# Patient Record
Sex: MALE | Race: WHITE | NOT HISPANIC OR LATINO
[De-identification: names, ages, dates, MRNs, and addresses within clinical notes are randomized per-mention and may not be internally consistent; named-entity substitution may affect disease eponyms.]

---

## 2018-04-24 ENCOUNTER — APPOINTMENT (OUTPATIENT)
Dept: FAMILY MEDICINE | Facility: CLINIC | Age: 58
End: 2018-04-24
Payer: OTHER GOVERNMENT

## 2018-04-24 VITALS
HEART RATE: 75 BPM | TEMPERATURE: 98.1 F | BODY MASS INDEX: 27.49 KG/M2 | WEIGHT: 192 LBS | OXYGEN SATURATION: 97 % | SYSTOLIC BLOOD PRESSURE: 126 MMHG | RESPIRATION RATE: 17 BRPM | HEIGHT: 70 IN | DIASTOLIC BLOOD PRESSURE: 80 MMHG

## 2018-04-24 DIAGNOSIS — R10.13 EPIGASTRIC PAIN: ICD-10-CM

## 2018-04-24 PROCEDURE — 99202 OFFICE O/P NEW SF 15 MIN: CPT

## 2018-05-08 ENCOUNTER — MESSAGE (OUTPATIENT)
Age: 58
End: 2018-05-08

## 2018-05-24 ENCOUNTER — APPOINTMENT (OUTPATIENT)
Dept: ULTRASOUND IMAGING | Facility: CLINIC | Age: 58
End: 2018-05-24
Payer: OTHER GOVERNMENT

## 2018-05-24 ENCOUNTER — OUTPATIENT (OUTPATIENT)
Dept: OUTPATIENT SERVICES | Facility: HOSPITAL | Age: 58
LOS: 1 days | End: 2018-05-24
Payer: OTHER GOVERNMENT

## 2018-05-24 DIAGNOSIS — Z00.8 ENCOUNTER FOR OTHER GENERAL EXAMINATION: ICD-10-CM

## 2018-05-24 PROCEDURE — 76705 ECHO EXAM OF ABDOMEN: CPT

## 2018-05-24 PROCEDURE — 76705 ECHO EXAM OF ABDOMEN: CPT | Mod: 26

## 2018-05-26 ENCOUNTER — LABORATORY RESULT (OUTPATIENT)
Age: 58
End: 2018-05-26

## 2018-06-04 ENCOUNTER — TRANSCRIPTION ENCOUNTER (OUTPATIENT)
Age: 58
End: 2018-06-04

## 2018-06-09 LAB
ALBUMIN SERPL ELPH-MCNC: 4.6 G/DL
ALP BLD-CCNC: 75 U/L
ALT SERPL-CCNC: 14 U/L
AST SERPL-CCNC: 15 U/L
BILIRUB DIRECT SERPL-MCNC: 0.1 MG/DL
BILIRUB INDIRECT SERPL-MCNC: 0.5 MG/DL
BILIRUB SERPL-MCNC: 0.6 MG/DL
PROT SERPL-MCNC: 6.8 G/DL

## 2018-08-31 ENCOUNTER — APPOINTMENT (OUTPATIENT)
Dept: GASTROENTEROLOGY | Facility: CLINIC | Age: 58
End: 2018-08-31
Payer: OTHER GOVERNMENT

## 2018-08-31 VITALS
HEIGHT: 70 IN | BODY MASS INDEX: 27.06 KG/M2 | RESPIRATION RATE: 17 BRPM | HEART RATE: 65 BPM | WEIGHT: 189 LBS | SYSTOLIC BLOOD PRESSURE: 120 MMHG | TEMPERATURE: 97.7 F | DIASTOLIC BLOOD PRESSURE: 80 MMHG | OXYGEN SATURATION: 98 %

## 2018-08-31 DIAGNOSIS — Z87.19 PERSONAL HISTORY OF OTHER DISEASES OF THE DIGESTIVE SYSTEM: ICD-10-CM

## 2018-08-31 DIAGNOSIS — K80.50 CALCULUS OF BILE DUCT W/OUT CHOLANGITIS OR CHOLECYSTITIS W/OUT OBSTRUCTION: ICD-10-CM

## 2018-08-31 DIAGNOSIS — Z12.11 ENCOUNTER FOR SCREENING FOR MALIGNANT NEOPLASM OF COLON: ICD-10-CM

## 2018-08-31 PROCEDURE — 99244 OFF/OP CNSLTJ NEW/EST MOD 40: CPT

## 2018-09-01 PROBLEM — Z87.19 HISTORY OF INGUINAL HERNIA: Status: RESOLVED | Noted: 2018-09-01 | Resolved: 2018-09-01

## 2018-09-01 PROBLEM — K80.50 BILIARY COLIC: Status: ACTIVE | Noted: 2018-09-01

## 2019-01-08 ENCOUNTER — RX RENEWAL (OUTPATIENT)
Age: 59
End: 2019-01-08

## 2019-01-10 ENCOUNTER — APPOINTMENT (OUTPATIENT)
Dept: FAMILY MEDICINE | Facility: CLINIC | Age: 59
End: 2019-01-10
Payer: OTHER GOVERNMENT

## 2019-01-10 VITALS — DIASTOLIC BLOOD PRESSURE: 80 MMHG | SYSTOLIC BLOOD PRESSURE: 128 MMHG

## 2019-01-10 DIAGNOSIS — R10.11 RIGHT UPPER QUADRANT PAIN: ICD-10-CM

## 2019-01-10 DIAGNOSIS — K80.20 CALCULUS OF GALLBLADDER W/OUT CHOLECYSTITIS W/OUT OBSTRUCTION: ICD-10-CM

## 2019-01-10 PROCEDURE — 99213 OFFICE O/P EST LOW 20 MIN: CPT

## 2019-01-10 RX ORDER — RANITIDINE HYDROCHLORIDE 150 MG/1
150 TABLET, FILM COATED ORAL
Refills: 0 | Status: COMPLETED | COMMUNITY
End: 2019-01-10

## 2019-01-10 RX ORDER — PANTOPRAZOLE 40 MG/1
40 TABLET, DELAYED RELEASE ORAL
Qty: 30 | Refills: 5 | Status: COMPLETED | COMMUNITY
Start: 2018-04-24 | End: 2019-01-10

## 2019-01-10 NOTE — HISTORY OF PRESENT ILLNESS
[de-identified] : Patient presents to office for followup visit. Patient  diagnosed 6 months ago with gallstones. Patient date the last 3 months has not had any productive quadrant pain however prior to that patient had 6-7 episodes yearly. Patient recently saw gastrointestinal and we'll be planning to have surgery within the next month 2. Patient admits to drinking one to 2 beers weekly

## 2019-01-10 NOTE — PHYSICAL EXAM
[No Acute Distress] : no acute distress [Regular Rhythm] : with a regular rhythm [Soft] : abdomen soft [Non Tender] : non-tender [Non-distended] : non-distended [Normal Bowel Sounds] : normal bowel sounds

## 2019-01-10 NOTE — ASSESSMENT
[FreeTextEntry1] : Patient presents to office for followup visit. Patient  diagnosed 6 months ago with gallstones. Patient date the last 3 months has not had any productive quadrant pain however prior to that patient had 6-7 episodes yearly. Patient recently saw gastrointestinal and we'll be planning to have surgery within the next month 2. Patient admits to drinking one to 2 beers weekly\par \par Pt  will continue with Losartan daily\par Recheck Lipids

## 2019-01-11 ENCOUNTER — EMERGENCY (EMERGENCY)
Facility: HOSPITAL | Age: 59
LOS: 1 days | Discharge: HOME | End: 2019-01-11
Attending: EMERGENCY MEDICINE
Payer: OTHER GOVERNMENT

## 2019-01-11 VITALS
TEMPERATURE: 98 F | HEART RATE: 82 BPM | RESPIRATION RATE: 18 BRPM | DIASTOLIC BLOOD PRESSURE: 106 MMHG | OXYGEN SATURATION: 100 % | SYSTOLIC BLOOD PRESSURE: 200 MMHG | HEIGHT: 68 IN | WEIGHT: 188.94 LBS

## 2019-01-11 PROCEDURE — 99284 EMERGENCY DEPT VISIT MOD MDM: CPT

## 2019-01-11 PROCEDURE — 70481 CT ORBIT/EAR/FOSSA W/DYE: CPT

## 2019-01-11 PROCEDURE — 70481 CT ORBIT/EAR/FOSSA W/DYE: CPT | Mod: 26

## 2019-01-11 PROCEDURE — 99284 EMERGENCY DEPT VISIT MOD MDM: CPT | Mod: 25

## 2019-01-11 NOTE — ED ADULT TRIAGE NOTE - CHIEF COMPLAINT QUOTE
right eye blurry vision x 2-3 months  sent by optho for optic neuritis and macular edema  pt has not taken his antihypertensive meds for one week

## 2019-01-11 NOTE — ED PROVIDER NOTE - OBJECTIVE STATEMENT
Attn - pt seen in [facility] -  pt sent from Optometry office for further evaluation and poss MRI.  pt c/o painless blurred vision in R eye for several weeks.  no trauma or other neuro sympt.  Pt states was told possible optic neuritis/edema of optic nerve.  pt wears corrective lenses for distance and reading.  PMHx HTN and Losartan.

## 2019-01-11 NOTE — CONSULT NOTE ADULT - SUBJECTIVE AND OBJECTIVE BOX
[facility] Ophthalmology Consult Note    HPI: "Pt sent from Optometry office for further evaluation and poss MRI.  pt c/o painless blurred vision in R eye for several weeks.  no trauma or other neuro sympt.  Pt states was told possible optic neuritis/edema of optic nerve.  pt wears corrective lenses for distance and reading.  PMHx HTN and Losartan."  Patient reports over one month of right eye visual change. Reports acute blurriness with no improvement or progression since. Denies eye pain. Denies headache. Denies eye trauma.     PMH: HTN  POcHx:  Denies surgeries, lasers or trauma  FamOcHx: Denies  Meds: no gtts   Allergies: NKDA    ROS:  General (neg), Vision (per HPI), Head and Neck (neg), Pulm (neg), CV (neg), GI (neg),  (neg), Musculoskeletal (neg), Skin/Integ (neg), Neuro (neg), Endocrine (neg), Heme (neg), All/Immuno (neg)    Mood and Affect Appropriate ( x ),  Oriented to Time, Place, and Person x 3 ( x )    Ophthalmology Exam    Visual acuity (cc): 20/70 PH 20/60 OD 20/25 OS  Pupils: PERRL OU, no APD  Ttono: 17 OU  Extraocular movements (EOMs): Full OU, no pain, no diplopia   Confrontational Visual Field (CVF):  Full OU  Color Plates: 12/12 OU    PLE:   External:  Flat   Lids/Lashes/Lacrimal Ducts: WNL OU  Sclera/ Conj: W+Q OU  Cornea: Cl OU  Anterior Chamber: D+F OU  Iris:  Flat OU  Lens:  Cl OU    Fundus Exam: dilated with 1% tropicamide and 2.5% phenylephrine  Approval obtained from primary team for dilation  Patient aware that pupils can remained dilated for at least 4-6 hours  Exam performed with 20D lens    Vitreous: wnl OU  Disc, cup/disc: Blurred disc margins with 360 disc heme OD  Macula:  edema OD  Vessels:  dilated and tortuous OD  Periphery: retinal DBH 4 quadrants OD    Diagnostic Testing:    A/P. Pending full dilated exam     Follow-Up:  Patient should follow up his/her ophthalmologist or in the [facility] within 1 week of discharge, sooner if symptoms worsen or change.  [address] [place] [id number]  [phone]    Please call the ophthalmologist to confirm an appointment time and date prior to discharge. [facility] Ophthalmology Consult Note    HPI: "Pt sent from Optometry office for further evaluation and poss MRI.  pt c/o painless blurred vision in R eye for several weeks.  no trauma or other neuro sympt.  Pt states was told possible optic neuritis/edema of optic nerve.  pt wears corrective lenses for distance and reading.  PMHx HTN and Losartan."  Patient reports over one month of right eye visual change. Reports acute blurriness with no improvement or progression since. Denies eye pain. Denies headache. Denies eye trauma.     PMH: HTN  POcHx:  Denies surgeries, lasers or trauma  FamOcHx: Denies  Meds: no gtts   Allergies: NKDA    ROS:  General (neg), Vision (per HPI), Head and Neck (neg), Pulm (neg), CV (neg), GI (neg),  (neg), Musculoskeletal (neg), Skin/Integ (neg), Neuro (neg), Endocrine (neg), Heme (neg), All/Immuno (neg)    Mood and Affect Appropriate ( x ),  Oriented to Time, Place, and Person x 3 ( x )    Ophthalmology Exam    Visual acuity (cc): 20/70 PH 20/60 OD 20/25 OS  Pupils: PERRL OU, no APD  Ttono: 17 OU  Extraocular movements (EOMs): Full OU, no pain, no diplopia   Confrontational Visual Field (CVF):  Full OU  Color Plates: 12/12 OU    PLE:   External:  Flat   Lids/Lashes/Lacrimal Ducts: WNL OU  Sclera/ Conj: W+Q OU  Cornea: Cl OU  Anterior Chamber: D+F OU  Iris:  Flat OU  Lens:  Cl OU    Fundus Exam: dilated with 1% tropicamide and 2.5% phenylephrine  Approval obtained from primary team for dilation  Patient aware that pupils can remained dilated for at least 4-6 hours  Exam performed with 20D lens    Vitreous: wnl OU  Disc, cup/disc: Blurred disc margins with 360 disc heme OD wnl OS  Macula:  edema OD WNL OS  Vessels:  dilated and tortuous OD WNL OS  Periphery: retinal DBH 4 quadrants OD WNL OS     Diagnostic Testing:    A/P. 58 y.o M w/ HTN with unilateral disc edema with 360 retinal heme. ddx includes CRVO versus congestive process. In the setting of HTN disease likely CRVO. Less likely NAION as patient with full color vision, no apd.   - CT orbit with contrast r/o infiltrative process/ mass effect   - Management of underlying condition/HTN as per primary team/PCP  - Stressed to patient need for outpatient follow up with risks of irreversible blindness 2/2 progression of pathology if lost to follow up.   - Recommend further outpatient follow up including FA and OCT     Follow-Up:  Patient should follow up his/her ophthalmologist or in the [facility] Monday 9am, sooner if symptoms worsen or change.  [address] [place] [address].  [place] [address] [phone]    d/w Senior Resident

## 2019-01-11 NOTE — ED ADULT NURSE NOTE - OBJECTIVE STATEMENT
Patient is a 58 year old male complaining of right eye visual change over 2-3 mo. Arrived by walk-in from triage. Patient has history of gallstones, and HTN on losartan. Patient is A&O x 4 and appears well. Pt reports R. eye has become increasingly blurry, was seen my optometrist and referred to ED to r/o optic neuritis. Endorsing complaints of R.eye blurriness in ED, patients visual acuity with bedside Snellen test 20/30 left eye, 20/100 right eye. Patient wears bifocals an has baseline vital issues. Denies complaints of chest pain, sob, fevers, chills, n/v/d, headache, syncope, burning urination, blood in urine, blood in stool, eye injury, numbness/ tinging, neurological issues. Abd is soft, non tender, non distended. Skin is warm and dry. Color is consistent with ethnicity. NAD. He hypertensive and asymptomatic in ED. MD Burr aware. Safety and comfort maintained. No family at the bedside. Will continue to monitor.

## 2019-01-11 NOTE — ED PROVIDER NOTE - NSFOLLOWUPINSTRUCTIONS_ED_ALL_ED_FT
1) Please follow-up with the [facility] (referral information below).   2) If you have any worsening of symptoms or any other concerns please return to the ED immediately.  3) Please continue taking your home medications as directed.  4) You may have been given a copy of your labs and/or imaging.  Please go over these with your primary care doctor.     [facility] Monday 9am, sooner if symptoms worsen or change.  [address] [place] [address]  [phone]    They are expecting you. You do not even need to call.

## 2019-01-11 NOTE — ED PROVIDER NOTE - PHYSICAL EXAMINATION
Attn - alert, nad, elevated BP, L/L/L - normal, S/C - clear, PERRL 3 mm, EOMI, VF intact on confrontation, Acuity with hand held card and with correction OD - 20/100, OS - 20/30.  carotids +2/4, no bruit. lungs-, cor-, Neuro - CN intact, motor - nonfocal, sensory - nonfocal, F-->N intact, no truncal ataxia. speech - clear and fluent.

## 2019-01-12 VITALS
SYSTOLIC BLOOD PRESSURE: 179 MMHG | OXYGEN SATURATION: 100 % | HEART RATE: 88 BPM | RESPIRATION RATE: 18 BRPM | DIASTOLIC BLOOD PRESSURE: 99 MMHG

## 2019-01-14 ENCOUNTER — APPOINTMENT (OUTPATIENT)
Dept: OPHTHALMOLOGY | Facility: CLINIC | Age: 59
End: 2019-01-14

## 2019-01-14 PROBLEM — I10 ESSENTIAL (PRIMARY) HYPERTENSION: Chronic | Status: ACTIVE | Noted: 2019-01-11

## 2019-01-16 ENCOUNTER — APPOINTMENT (OUTPATIENT)
Dept: OPHTHALMOLOGY | Facility: CLINIC | Age: 59
End: 2019-01-16

## 2019-03-27 ENCOUNTER — APPOINTMENT (OUTPATIENT)
Dept: OPHTHALMOLOGY | Facility: CLINIC | Age: 59
End: 2019-03-27

## 2019-05-08 ENCOUNTER — APPOINTMENT (OUTPATIENT)
Dept: OPHTHALMOLOGY | Facility: CLINIC | Age: 59
End: 2019-05-08

## 2019-06-11 ENCOUNTER — TRANSCRIPTION ENCOUNTER (OUTPATIENT)
Age: 59
End: 2019-06-11

## 2019-06-13 ENCOUNTER — APPOINTMENT (OUTPATIENT)
Dept: OPHTHALMOLOGY | Facility: CLINIC | Age: 59
End: 2019-06-13

## 2019-07-24 ENCOUNTER — APPOINTMENT (OUTPATIENT)
Dept: OPHTHALMOLOGY | Facility: CLINIC | Age: 59
End: 2019-07-24

## 2019-09-11 ENCOUNTER — APPOINTMENT (OUTPATIENT)
Dept: OPHTHALMOLOGY | Facility: CLINIC | Age: 59
End: 2019-09-11

## 2019-09-24 ENCOUNTER — APPOINTMENT (OUTPATIENT)
Dept: FAMILY MEDICINE | Facility: CLINIC | Age: 59
End: 2019-09-24
Payer: OTHER GOVERNMENT

## 2019-09-24 VITALS — SYSTOLIC BLOOD PRESSURE: 122 MMHG | DIASTOLIC BLOOD PRESSURE: 80 MMHG

## 2019-09-24 DIAGNOSIS — M10.9 GOUT, UNSPECIFIED: ICD-10-CM

## 2019-09-24 PROCEDURE — 36415 COLL VENOUS BLD VENIPUNCTURE: CPT

## 2019-09-24 PROCEDURE — 99213 OFFICE O/P EST LOW 20 MIN: CPT | Mod: 25

## 2019-09-24 NOTE — ADDENDUM
[FreeTextEntry1] : I, Dorene Frausto, acted solely as a scribe for Dr. Rowley on this date 09/24/2019.\par \par All medical record entries made by the Scribe were at my, Dr. Rowley, direction and personally dictated by me on 09/24/2019. I have reviewed the chart and agree that the record accurately reflects my personal performance of the history, physical exam, assessment and plan.  I have also personally directed, reviewed and agreed with the chart.

## 2019-09-24 NOTE — HISTORY OF PRESENT ILLNESS
[FreeTextEntry8] : 57y/o M with PMHx of HTN (Losartan) presents to the office with c/o swollen toe on left foot that first appeared 3 months ago. Pt went to urgent care and was prescribed medication that resolved pain and swelling. Swelling recently appeared again in the same toe.  Denies injury or trauma.

## 2019-09-24 NOTE — PHYSICAL EXAM
[Normal] : no acute distress, well-nourished, well developed, well appearing [de-identified] : swelling in left foot 4th digit mildly tender mild anterior foot swelling  [de-identified] : AA&Ox3  [de-identified] : mildy erytematous

## 2019-09-24 NOTE — PLAN
[FreeTextEntry1] : Swollen toe\par - Start Indomethacin  50mgTID\par - Blood work drawn in office - uric acid

## 2019-10-09 ENCOUNTER — APPOINTMENT (OUTPATIENT)
Dept: OPHTHALMOLOGY | Facility: CLINIC | Age: 59
End: 2019-10-09

## 2019-11-03 LAB — URATE SERPL-MCNC: 7.5 MG/DL

## 2019-11-13 ENCOUNTER — APPOINTMENT (OUTPATIENT)
Dept: OPHTHALMOLOGY | Facility: CLINIC | Age: 59
End: 2019-11-13

## 2020-01-22 ENCOUNTER — APPOINTMENT (OUTPATIENT)
Dept: OPHTHALMOLOGY | Facility: CLINIC | Age: 60
End: 2020-01-22

## 2020-02-18 ENCOUNTER — TRANSCRIPTION ENCOUNTER (OUTPATIENT)
Age: 60
End: 2020-02-18

## 2020-02-19 ENCOUNTER — APPOINTMENT (OUTPATIENT)
Dept: OPHTHALMOLOGY | Facility: CLINIC | Age: 60
End: 2020-02-19

## 2020-04-30 ENCOUNTER — APPOINTMENT (OUTPATIENT)
Dept: FAMILY MEDICINE | Facility: CLINIC | Age: 60
End: 2020-04-30

## 2020-12-16 PROBLEM — Z12.11 ENCOUNTER FOR SCREENING COLONOSCOPY: Status: RESOLVED | Noted: 2018-09-01 | Resolved: 2020-12-16

## 2021-10-17 ENCOUNTER — TRANSCRIPTION ENCOUNTER (OUTPATIENT)
Age: 61
End: 2021-10-17

## 2021-10-17 ENCOUNTER — INPATIENT (INPATIENT)
Facility: HOSPITAL | Age: 61
LOS: 1 days | Discharge: HOME | DRG: 247 | End: 2021-10-19
Attending: STUDENT IN AN ORGANIZED HEALTH CARE EDUCATION/TRAINING PROGRAM | Admitting: STUDENT IN AN ORGANIZED HEALTH CARE EDUCATION/TRAINING PROGRAM
Payer: OTHER GOVERNMENT

## 2021-10-17 VITALS
OXYGEN SATURATION: 100 % | RESPIRATION RATE: 15 BRPM | DIASTOLIC BLOOD PRESSURE: 100 MMHG | HEIGHT: 68 IN | HEART RATE: 90 BPM | SYSTOLIC BLOOD PRESSURE: 168 MMHG

## 2021-10-17 DIAGNOSIS — I21.3 ST ELEVATION (STEMI) MYOCARDIAL INFARCTION OF UNSPECIFIED SITE: ICD-10-CM

## 2021-10-17 LAB
ALBUMIN SERPL ELPH-MCNC: 4.8 G/DL — SIGNIFICANT CHANGE UP (ref 3.3–5)
ALBUMIN SERPL ELPH-MCNC: 4.9 G/DL — SIGNIFICANT CHANGE UP (ref 3.3–5)
ALP SERPL-CCNC: 80 U/L — SIGNIFICANT CHANGE UP (ref 40–120)
ALP SERPL-CCNC: 87 U/L — SIGNIFICANT CHANGE UP (ref 40–120)
ALT FLD-CCNC: 25 U/L — SIGNIFICANT CHANGE UP (ref 10–45)
ALT FLD-CCNC: 45 U/L — SIGNIFICANT CHANGE UP (ref 10–45)
ANION GAP SERPL CALC-SCNC: 16 MMOL/L — SIGNIFICANT CHANGE UP (ref 5–17)
ANION GAP SERPL CALC-SCNC: 19 MMOL/L — HIGH (ref 5–17)
APTT BLD: 28.9 SEC — SIGNIFICANT CHANGE UP (ref 27.5–35.5)
AST SERPL-CCNC: 139 U/L — HIGH (ref 10–40)
AST SERPL-CCNC: 33 U/L — SIGNIFICANT CHANGE UP (ref 10–40)
BASOPHILS # BLD AUTO: 0.03 K/UL — SIGNIFICANT CHANGE UP (ref 0–0.2)
BASOPHILS NFR BLD AUTO: 0.2 % — SIGNIFICANT CHANGE UP (ref 0–2)
BILIRUB SERPL-MCNC: 0.4 MG/DL — SIGNIFICANT CHANGE UP (ref 0.2–1.2)
BILIRUB SERPL-MCNC: 0.6 MG/DL — SIGNIFICANT CHANGE UP (ref 0.2–1.2)
BUN SERPL-MCNC: 14 MG/DL — SIGNIFICANT CHANGE UP (ref 7–23)
BUN SERPL-MCNC: 15 MG/DL — SIGNIFICANT CHANGE UP (ref 7–23)
CALCIUM SERPL-MCNC: 9.6 MG/DL — SIGNIFICANT CHANGE UP (ref 8.4–10.5)
CALCIUM SERPL-MCNC: 9.7 MG/DL — SIGNIFICANT CHANGE UP (ref 8.4–10.5)
CHLORIDE SERPL-SCNC: 101 MMOL/L — SIGNIFICANT CHANGE UP (ref 96–108)
CHLORIDE SERPL-SCNC: 99 MMOL/L — SIGNIFICANT CHANGE UP (ref 96–108)
CK MB CFR SERPL CALC: 290.4 NG/ML — HIGH (ref 0–6.7)
CK MB CFR SERPL CALC: 3.7 NG/ML — SIGNIFICANT CHANGE UP (ref 0–6.7)
CK SERPL-CCNC: 90 U/L — SIGNIFICANT CHANGE UP (ref 30–200)
CO2 SERPL-SCNC: 18 MMOL/L — LOW (ref 22–31)
CO2 SERPL-SCNC: 22 MMOL/L — SIGNIFICANT CHANGE UP (ref 22–31)
CREAT SERPL-MCNC: 0.8 MG/DL — SIGNIFICANT CHANGE UP (ref 0.5–1.3)
CREAT SERPL-MCNC: 0.9 MG/DL — SIGNIFICANT CHANGE UP (ref 0.5–1.3)
EOSINOPHIL # BLD AUTO: 0.01 K/UL — SIGNIFICANT CHANGE UP (ref 0–0.5)
EOSINOPHIL NFR BLD AUTO: 0.1 % — SIGNIFICANT CHANGE UP (ref 0–6)
GLUCOSE SERPL-MCNC: 131 MG/DL — HIGH (ref 70–99)
GLUCOSE SERPL-MCNC: 139 MG/DL — HIGH (ref 70–99)
HCT VFR BLD CALC: 44.3 % — SIGNIFICANT CHANGE UP (ref 39–50)
HCT VFR BLD CALC: 47.6 % — SIGNIFICANT CHANGE UP (ref 39–50)
HGB BLD-MCNC: 14.8 G/DL — SIGNIFICANT CHANGE UP (ref 13–17)
HGB BLD-MCNC: 16 G/DL — SIGNIFICANT CHANGE UP (ref 13–17)
IMM GRANULOCYTES NFR BLD AUTO: 0.6 % — SIGNIFICANT CHANGE UP (ref 0–1.5)
INR BLD: 1.03 RATIO — SIGNIFICANT CHANGE UP (ref 0.88–1.16)
LYMPHOCYTES # BLD AUTO: 0.96 K/UL — LOW (ref 1–3.3)
LYMPHOCYTES # BLD AUTO: 7.3 % — LOW (ref 13–44)
MAGNESIUM SERPL-MCNC: 2 MG/DL — SIGNIFICANT CHANGE UP (ref 1.6–2.6)
MAGNESIUM SERPL-MCNC: 2.4 MG/DL — SIGNIFICANT CHANGE UP (ref 1.6–2.6)
MCHC RBC-ENTMCNC: 28 PG — SIGNIFICANT CHANGE UP (ref 27–34)
MCHC RBC-ENTMCNC: 28.1 PG — SIGNIFICANT CHANGE UP (ref 27–34)
MCHC RBC-ENTMCNC: 33.4 GM/DL — SIGNIFICANT CHANGE UP (ref 32–36)
MCHC RBC-ENTMCNC: 33.6 GM/DL — SIGNIFICANT CHANGE UP (ref 32–36)
MCV RBC AUTO: 83.7 FL — SIGNIFICANT CHANGE UP (ref 80–100)
MCV RBC AUTO: 83.9 FL — SIGNIFICANT CHANGE UP (ref 80–100)
MONOCYTES # BLD AUTO: 0.4 K/UL — SIGNIFICANT CHANGE UP (ref 0–0.9)
MONOCYTES NFR BLD AUTO: 3 % — SIGNIFICANT CHANGE UP (ref 2–14)
NEUTROPHILS # BLD AUTO: 11.65 K/UL — HIGH (ref 1.8–7.4)
NEUTROPHILS NFR BLD AUTO: 88.8 % — HIGH (ref 43–77)
NRBC # BLD: 0 /100 WBCS — SIGNIFICANT CHANGE UP (ref 0–0)
NRBC # BLD: 0 /100 WBCS — SIGNIFICANT CHANGE UP (ref 0–0)
NT-PROBNP SERPL-SCNC: 35 PG/ML — SIGNIFICANT CHANGE UP (ref 0–300)
PHOSPHATE SERPL-MCNC: 2.6 MG/DL — SIGNIFICANT CHANGE UP (ref 2.5–4.5)
PLATELET # BLD AUTO: 185 K/UL — SIGNIFICANT CHANGE UP (ref 150–400)
PLATELET # BLD AUTO: 215 K/UL — SIGNIFICANT CHANGE UP (ref 150–400)
POTASSIUM SERPL-MCNC: 3.9 MMOL/L — SIGNIFICANT CHANGE UP (ref 3.5–5.3)
POTASSIUM SERPL-MCNC: 4.9 MMOL/L — SIGNIFICANT CHANGE UP (ref 3.5–5.3)
POTASSIUM SERPL-SCNC: 3.9 MMOL/L — SIGNIFICANT CHANGE UP (ref 3.5–5.3)
POTASSIUM SERPL-SCNC: 4.9 MMOL/L — SIGNIFICANT CHANGE UP (ref 3.5–5.3)
PROT SERPL-MCNC: 7.3 G/DL — SIGNIFICANT CHANGE UP (ref 6–8.3)
PROT SERPL-MCNC: 7.9 G/DL — SIGNIFICANT CHANGE UP (ref 6–8.3)
PROTHROM AB SERPL-ACNC: 12.3 SEC — SIGNIFICANT CHANGE UP (ref 10.6–13.6)
RBC # BLD: 5.28 M/UL — SIGNIFICANT CHANGE UP (ref 4.2–5.8)
RBC # BLD: 5.69 M/UL — SIGNIFICANT CHANGE UP (ref 4.2–5.8)
RBC # FLD: 12.5 % — SIGNIFICANT CHANGE UP (ref 10.3–14.5)
RBC # FLD: 12.5 % — SIGNIFICANT CHANGE UP (ref 10.3–14.5)
SARS-COV-2 RNA SPEC QL NAA+PROBE: SIGNIFICANT CHANGE UP
SODIUM SERPL-SCNC: 136 MMOL/L — SIGNIFICANT CHANGE UP (ref 135–145)
SODIUM SERPL-SCNC: 139 MMOL/L — SIGNIFICANT CHANGE UP (ref 135–145)
TROPONIN T, HIGH SENSITIVITY RESULT: 42 NG/L — SIGNIFICANT CHANGE UP (ref 0–51)
TROPONIN T, HIGH SENSITIVITY RESULT: 4540 NG/L — HIGH (ref 0–51)
WBC # BLD: 13.13 K/UL — HIGH (ref 3.8–10.5)
WBC # BLD: 14.4 K/UL — HIGH (ref 3.8–10.5)
WBC # FLD AUTO: 13.13 K/UL — HIGH (ref 3.8–10.5)
WBC # FLD AUTO: 14.4 K/UL — HIGH (ref 3.8–10.5)

## 2021-10-17 PROCEDURE — 93010 ELECTROCARDIOGRAM REPORT: CPT

## 2021-10-17 PROCEDURE — 71045 X-RAY EXAM CHEST 1 VIEW: CPT | Mod: 26

## 2021-10-17 PROCEDURE — 99152 MOD SED SAME PHYS/QHP 5/>YRS: CPT

## 2021-10-17 PROCEDURE — 92941 PRQ TRLML REVSC TOT OCCL AMI: CPT | Mod: LC

## 2021-10-17 PROCEDURE — 99291 CRITICAL CARE FIRST HOUR: CPT

## 2021-10-17 PROCEDURE — 93458 L HRT ARTERY/VENTRICLE ANGIO: CPT | Mod: 26,59

## 2021-10-17 PROCEDURE — 99291 CRITICAL CARE FIRST HOUR: CPT | Mod: 25

## 2021-10-17 RX ORDER — METOPROLOL TARTRATE 50 MG
25 TABLET ORAL EVERY 8 HOURS
Refills: 0 | Status: DISCONTINUED | OUTPATIENT
Start: 2021-10-17 | End: 2021-10-18

## 2021-10-17 RX ORDER — HEPARIN SODIUM 5000 [USP'U]/ML
4000 INJECTION INTRAVENOUS; SUBCUTANEOUS EVERY 6 HOURS
Refills: 0 | Status: DISCONTINUED | OUTPATIENT
Start: 2021-10-17 | End: 2021-10-17

## 2021-10-17 RX ORDER — HEPARIN SODIUM 5000 [USP'U]/ML
INJECTION INTRAVENOUS; SUBCUTANEOUS
Qty: 25000 | Refills: 0 | Status: DISCONTINUED | OUTPATIENT
Start: 2021-10-17 | End: 2021-10-17

## 2021-10-17 RX ORDER — LIDOCAINE HCL 20 MG/ML
50 VIAL (ML) INJECTION ONCE
Refills: 0 | Status: COMPLETED | OUTPATIENT
Start: 2021-10-17 | End: 2021-10-17

## 2021-10-17 RX ORDER — LIDOCAINE HCL 20 MG/ML
100 VIAL (ML) INJECTION ONCE
Refills: 0 | Status: COMPLETED | OUTPATIENT
Start: 2021-10-17 | End: 2021-10-17

## 2021-10-17 RX ORDER — EPTIFIBATIDE 2 MG/ML
2 INJECTION, SOLUTION INTRAVENOUS
Qty: 75 | Refills: 0 | Status: DISCONTINUED | OUTPATIENT
Start: 2021-10-17 | End: 2021-10-18

## 2021-10-17 RX ORDER — CHLORHEXIDINE GLUCONATE 213 G/1000ML
1 SOLUTION TOPICAL
Refills: 0 | Status: DISCONTINUED | OUTPATIENT
Start: 2021-10-17 | End: 2021-10-18

## 2021-10-17 RX ORDER — POTASSIUM CHLORIDE 20 MEQ
10 PACKET (EA) ORAL ONCE
Refills: 0 | Status: COMPLETED | OUTPATIENT
Start: 2021-10-17 | End: 2021-10-17

## 2021-10-17 RX ORDER — TICAGRELOR 90 MG/1
180 TABLET ORAL ONCE
Refills: 0 | Status: COMPLETED | OUTPATIENT
Start: 2021-10-17 | End: 2021-10-17

## 2021-10-17 RX ORDER — TICAGRELOR 90 MG/1
90 TABLET ORAL EVERY 12 HOURS
Refills: 0 | Status: DISCONTINUED | OUTPATIENT
Start: 2021-10-18 | End: 2021-10-19

## 2021-10-17 RX ORDER — LORATADINE 10 MG/1
10 TABLET ORAL DAILY
Refills: 0 | Status: DISCONTINUED | OUTPATIENT
Start: 2021-10-17 | End: 2021-10-19

## 2021-10-17 RX ORDER — METOPROLOL TARTRATE 50 MG
5 TABLET ORAL ONCE
Refills: 0 | Status: COMPLETED | OUTPATIENT
Start: 2021-10-17 | End: 2021-10-17

## 2021-10-17 RX ORDER — HEPARIN SODIUM 5000 [USP'U]/ML
5000 INJECTION INTRAVENOUS; SUBCUTANEOUS ONCE
Refills: 0 | Status: COMPLETED | OUTPATIENT
Start: 2021-10-17 | End: 2021-10-17

## 2021-10-17 RX ORDER — ASPIRIN/CALCIUM CARB/MAGNESIUM 324 MG
81 TABLET ORAL DAILY
Refills: 0 | Status: DISCONTINUED | OUTPATIENT
Start: 2021-10-18 | End: 2021-10-19

## 2021-10-17 RX ORDER — LORATADINE 10 MG/1
1 TABLET ORAL
Qty: 0 | Refills: 0 | DISCHARGE

## 2021-10-17 RX ORDER — INFLUENZA VIRUS VACCINE 15; 15; 15; 15 UG/.5ML; UG/.5ML; UG/.5ML; UG/.5ML
0.5 SUSPENSION INTRAMUSCULAR ONCE
Refills: 0 | Status: DISCONTINUED | OUTPATIENT
Start: 2021-10-17 | End: 2021-10-19

## 2021-10-17 RX ORDER — HEPARIN SODIUM 5000 [USP'U]/ML
4000 INJECTION INTRAVENOUS; SUBCUTANEOUS ONCE
Refills: 0 | Status: DISCONTINUED | OUTPATIENT
Start: 2021-10-17 | End: 2021-10-17

## 2021-10-17 RX ORDER — LIDOCAINE HCL 20 MG/ML
2 VIAL (ML) INJECTION
Qty: 2 | Refills: 0 | Status: DISCONTINUED | OUTPATIENT
Start: 2021-10-17 | End: 2021-10-17

## 2021-10-17 RX ORDER — ATORVASTATIN CALCIUM 80 MG/1
80 TABLET, FILM COATED ORAL AT BEDTIME
Refills: 0 | Status: DISCONTINUED | OUTPATIENT
Start: 2021-10-17 | End: 2021-10-19

## 2021-10-17 RX ADMIN — Medication 100 MILLIGRAM(S): at 18:54

## 2021-10-17 RX ADMIN — Medication 5 MILLIGRAM(S): at 18:29

## 2021-10-17 RX ADMIN — Medication 25 MILLIGRAM(S): at 21:29

## 2021-10-17 RX ADMIN — TICAGRELOR 180 MILLIGRAM(S): 90 TABLET ORAL at 15:24

## 2021-10-17 RX ADMIN — Medication 5 MILLIGRAM(S): at 19:54

## 2021-10-17 RX ADMIN — Medication 50 MILLIGRAM(S): at 18:46

## 2021-10-17 RX ADMIN — HEPARIN SODIUM 5000 UNIT(S): 5000 INJECTION INTRAVENOUS; SUBCUTANEOUS at 15:30

## 2021-10-17 RX ADMIN — EPTIFIBATIDE 14.6 MICROGRAM(S)/KG/MIN: 2 INJECTION, SOLUTION INTRAVENOUS at 21:30

## 2021-10-17 RX ADMIN — Medication 10 MILLIEQUIVALENT(S): at 22:12

## 2021-10-17 RX ADMIN — ATORVASTATIN CALCIUM 80 MILLIGRAM(S): 80 TABLET, FILM COATED ORAL at 21:29

## 2021-10-17 NOTE — ED CLERICAL - NS ED CLERK NOTE PRE-ARRIVAL INFORMATION; ADDITIONAL PRE-ARRIVAL INFORMATION
CC/Reason For referral: STEMI   Preferred Consultant(if applicable):  Who admits for you (if needed):  Do you have documents you would like to fax over?  Would you still like to speak to an ED attending? No

## 2021-10-17 NOTE — H&P ADULT - NSHPLABSRESULTS_GEN_ALL_CORE
14.8   13.13 )-----------( 185      ( 17 Oct 2021 15:23 )             44.3   10-17    139  |  101  |  15  ----------------------------<  131<H>  4.9   |  22  |  0.90    Ca    9.6      17 Oct 2021 15:23  Mg     2.0     10-17    TPro  7.3  /  Alb  4.9  /  TBili  0.4  /  DBili  x   /  AST  33  /  ALT  25  /  AlkPhos  80  10-17      PT/INR - ( 17 Oct 2021 15:23 )   PT: 12.3 sec;   INR: 1.03 ratio         PTT - ( 17 Oct 2021 15:23 )  PTT:28.9 sec    COVID-19 PCR: NotDetec    `< from: Xray Chest 1 View- PORTABLE-Urgent (10.17.21 @ 15:30) >    INTERPRETATION:  HISTORY: Chest pain    TECHNIQUE: A single AP view of the chest was obtained.    COMPARISON: None.    FINDINGS:  The cardiac silhouette is normal in size. There are no focal consolidations or pleural effusions. The hilar and mediastinal structures appear unremarkable. The osseous structures are intact.    IMPRESSION: Clear lungs.    --- End of Report ---      < end of copied text >

## 2021-10-17 NOTE — PATIENT PROFILE ADULT - NSPROPOAPRESSUREINJURY_GEN_A_NUR
51 yo male with PMHx of HTN presents to [facility] ED with complaint of dizziness/vertigo and unsteady gait. As per pt report intermittent dizziness lasted for 2 days and progressed to constant dizziness/unable to stand without holding unto objects. Also reported two episodes of vomiting days prior, prompting urgent care evaluation. Patient was given Meclizine at the urgent care and was advised ED eval if symptoms persisted. CT head on admission shows no acute hemorrhage or mass effect. CTA head and neck shows diffusely hypoplastic right vertebral artery with calcifications, irregularity and intermittent enhancement of the right V4 segment, suspicious for stenosis and/or occlusion. Neurology evaluation requested. EKG NSR. ASA 325mg started.     Follow up MRI showed evolving subacute right brachium pontis CVA. TTE WNL. Symptoms continued to worsen in spite of permissive HTN and repeat CT was performed ruling out hemorrhagic conversion. HbA1c 9.5%. . Augmentin x7 days with Debrox gtts for Otitis Media.    At [facility] rehab patient completed comprehensive PT/OT/SLP program and performs activities with CG A/min A. Able to ambulate>120ft. While at rehab patient evaluated by medicine, renal and neuropsychology. IVF and BP regimen adjusted. Elevated creat evaluated-CKDIII to follow up outpt. Phanegran tapered for dizziness, nausea resolved. ASA to 81mg daily. Insulin regimen adjusted by medicine. Diabetic education completed-follow up outpt with PCP and Neurology. Stable for d/c to [facility] on 12/10/19.
no

## 2021-10-17 NOTE — ED PROVIDER NOTE - CARE PLAN
Principal Discharge DX:	ST elevation myocardial infarction (STEMI)   1 Principal Discharge DX:	ST elevation myocardial infarction (STEMI)  Goal:	Acute inferior STEMI

## 2021-10-17 NOTE — H&P ADULT - NSHPPHYSICALEXAM_GEN_ALL_CORE
VITALS:   T(C): 36.8 (10-17-21 @ 17:45), Max: 36.8 (10-17-21 @ 17:45)  HR: 75 (10-17-21 @ 17:45) (75 - 90)  BP: 148/90 (10-17-21 @ 17:45) (148/90 - 168/100)  RR: 14 (10-17-21 @ 17:45) (14 - 15)  SpO2: 98% (10-17-21 @ 17:45) (98% - 100%)    GENERAL: NAD, lying in bed comfortably  HEAD:  Atraumatic, Normocephalic  EYES: EOMI, PERRLA, conjunctiva and sclera clear  ENT: Moist mucous membranes  NECK: Supple, No JVD  CHEST/LUNG: Clear to auscultation bilaterally; No rales, rhonchi, wheezing, or rubs. Unlabored respirations  HEART: Regular rate and rhythm; No murmurs, rubs, or gallops  ABDOMEN: BSx4; Soft, nontender, nondistended  EXTREMITIES:  2+ Peripheral Pulses, brisk capillary refill. No clubbing, cyanosis, or edema  NERVOUS SYSTEM:  A&Ox3, no focal deficits   SKIN: No rashes or lesions

## 2021-10-17 NOTE — ED PROVIDER NOTE - NS ED ROS FT
GENERAL: No fever, no chills  EYES: no change in vision  HEENT: no trouble swallowing, no trouble speaking  CARDIAC: +chest pain, no palpitations  PULMONARY: no cough, no SOB  GI: no abdominal pain, no nausea, no vomiting, no diarrhea, no constipation  : no dysuria, no frequency, no change in appearance, no odor of urine  SKIN: no rashes  NEURO: no headache, no weakness  MSK: no joint pain

## 2021-10-17 NOTE — ED ADULT NURSE NOTE - NSIMPLEMENTINTERV_GEN_ALL_ED
Implemented All Universal Safety Interventions:  [place] to call system. Call bell, personal items and telephone within reach. Instruct patient to call for assistance. Room bathroom lighting operational. Non-slip footwear when patient is off stretcher. Physically safe environment: no spills, clutter or unnecessary equipment. Stretcher in lowest position, wheels locked, appropriate side rails in place.

## 2021-10-17 NOTE — PROGRESS NOTE ADULT - SUBJECTIVE AND OBJECTIVE BOX
CORDELIA MALIK  MRN-[id number]  Patient is a 60y old  Male who presents with a chief complaint of IWSTEMI (17 Oct 2021 17:49)    HPI:  59y/o M w/ h/o HTN, off medication x 1year, ?angioedema on daily allegra BIBEMS for STEMI. Pt states that he had chest pain L side starting at 12pm, went to urgent care and showed inferior wall STEMI. Was given 325mg ASA and 1 SL nitro. No sob, syncope, n/v, palpitations. Reports 1 prior episode 2 weeks prior which resolved with rest.    Here underwent PCI to dCx for 100% occlusion. Given 40mg IV lasix during cath for elevated LVEDP of 33. Loaded with Brilinta. Other [facility] cath findings include 20-30% LAD lesion and 10% mRCA. Reports feeling much improved with only residual chest discomfort.    On arrival to CCU patient went into wide complex tachycardia with rates in low 100s. /96. Denies chest pain, palpitations. (17 Oct 2021 17:49)      Hospital Course:  10/17 IWSTEMI    24 HOUR EVENTS:    REVIEW OF SYSTEMS:  CONSTITUTIONAL: No weakness, fevers or chills  EYES/ENT: No visual changes;  No vertigo or throat pain   NECK: No pain or stiffness  RESPIRATORY: No cough, wheezing, hemoptysis; No shortness of breath  CARDIOVASCULAR: + chest pain or palpitations  GASTROINTESTINAL: No abdominal or epigastric pain. No nausea, vomiting, or hematemesis; No diarrhea or constipation. No melena or hematochezia.  GENITOURINARY: No dysuria, frequency or hematuria  NEUROLOGICAL: No numbness or weakness  SKIN: No itching, rashes      ICU Vital Signs Last 24 Hrs  T(C): 36.6 (17 Oct 2021 20:00), Max: 36.8 (17 Oct 2021 17:45)  T(F): 97.9 (17 Oct 2021 20:00), Max: 98.2 (17 Oct 2021 17:45)  HR: 93 (17 Oct 2021 20:00) (75 - 102)  BP: 120/82 (17 Oct 2021 19:00) (120/82 - 168/100)  BP(mean): 97 (17 Oct 2021 19:00) (97 - 118)  ABP: --  ABP(mean): --  RR: 15 (17 Oct 2021 20:00) (12 - 17)  SpO2: 96% (17 Oct 2021 20:00) (96% - 100%)      CVP(mm Hg): --  CO: --  CI: --  PA: --  PA(mean): --  PA(direct): --  PCWP: --  LA: --  RA: --  SVR: --  SVRI: --  PVR: --  PVRI: --  I&O's Summary    17 Oct 2021 07:01  -  17 Oct 2021 20:38  --------------------------------------------------------  IN: 14.6 mL / OUT: 1300 mL / NET: -1285.4 mL        CAPILLARY BLOOD GLUCOSE    CAPILLARY BLOOD GLUCOSE          PHYSICAL EXAM:  GENERAL: NAD, lying in bed comfortably  HEAD:  Atraumatic, Normocephalic  EYES: EOMI, PERRLA, conjunctiva and sclera clear  ENT: Moist mucous membranes  NECK: Supple, No JVD  CHEST/LUNG: Clear to auscultation bilaterally; No rales, rhonchi, wheezing, or rubs. Unlabored respirations  HEART: Regular rate and rhythm; No murmurs, rubs, or gallops  ABDOMEN: BSx4; Soft, nontender, nondistended  EXTREMITIES:  2+ Peripheral Pulses, brisk capillary refill. No clubbing, cyanosis, or edema  NERVOUS SYSTEM:  A&Ox3, no focal deficits   SKIN: No rashes or lesions  ============================I/O===========================   I&O's Detail    17 Oct 2021 07:01  -  17 Oct 2021 20:38  --------------------------------------------------------  IN:    Eptifbatide: 14.6 mL  Total IN: 14.6 mL    OUT:    Voided (mL): 1300 mL  Total OUT: 1300 mL    Total NET: -1285.4 mL        ============================ LABS =========================                        16.0   14.40 )-----------( 215      ( 17 Oct 2021 18:51 )             47.6     10-17    136  |  99  |  14  ----------------------------<  139<H>  3.9   |  18<L>  |  0.80    Ca    9.7      17 Oct 2021 18:51  Phos  2.6     10-17  Mg     2.4     10-17    TPro  7.9  /  Alb  4.8  /  TBili  0.6  /  DBili  x   /  AST  139<H>  /  ALT  45  /  AlkPhos  87  10-17    Troponin T, High Sensitivity Result: 4540 ng/L (10-17-21 @ 18:51)  Troponin T, High Sensitivity Result: 42 ng/L (10-17-21 @ 15:23)    CKMB Units: 290.4 ng/mL (10-17-21 @ 18:51)  CKMB Units: 3.7 ng/mL (10-17-21 @ 15:23)    Creatine Kinase, Serum: 90 U/L (10-17-21 @ 15:23)          LIVER FUNCTIONS - ( 17 Oct 2021 18:51 )  Alb: 4.8 g/dL / Pro: 7.9 g/dL / ALK PHOS: 87 U/L / ALT: 45 U/L / AST: 139 U/L / GGT: x           PT/INR - ( 17 Oct 2021 15:23 )   PT: 12.3 sec;   INR: 1.03 ratio         PTT - ( 17 Oct 2021 15:23 )  PTT:28.9 sec        ======================Micro/Rad/Cardio=================  Telemtry: Reviewed   EKG: Reviewed  CXR: Reviewed  Culture: Reviewed   Echo:   Cath:   ======================================================  PAST MEDICAL & SURGICAL HISTORY:  Essential hypertension    H/O angioedema    No significant past surgical history      ====================ASSESSMENT ==============  IWSTEMI, S/p PCI to 100% dCx occlusion via R. radial access.  Wide complex tachycardiac  Leukocytosis    Plan:  ====================== NEUROLOGY=====================  -No active issues.  -A&Ox4    ==================== RESPIRATORY======================  Mechanical Ventilation:    loratadine 10 milliGRAM(s) Oral daily    ====================CARDIOVASCULAR==================  IWSTEMI, S/p PCI to 100% dCx occlusion via R. radial access.  -High intensity statin  -C/w integrilin  -F/U TTE in AM  -Trend enzymes to peak  -Introduce GDMT    Wide complex tachycardiac  S/p 5mg IV lopressor x2. And lido 150mg lido bolus. Favor AIVR over VT, patient currently asymptomatic.  -Start beta blockade, with lopressor 25mg q8h  -Check electrolytes    atorvastatin 80 milliGRAM(s) Oral at bedtime  metoprolol tartrate 25 milliGRAM(s) Oral every 8 hours    ===================HEMATOLOGIC/ONC ===================  - No active issues  - c/w Integrilin in setting of IWSTEMI    eptifibatide Infusion 75 mg/100 mL 2 MICROgram(s)/kG/Min (14.6 mL/Hr) IV Continuous <Continuous>    ===================== RENAL =========================  -No Cr baseline, unknown   -Continue to trend I/Os, serum Cr  -Check electrolytes given VT on arrival to unit    ==================== GASTROINTESTINAL===================  -DASH/TLC diet  -Last BM 10/17 AM    potassium chloride    Tablet ER 10 milliEquivalent(s) Oral once    =======================    ENDOCRINE  =====================  No active issues  -Check A1c  -Check TSH      ========================INFECTIOUS DISEASE================  -Slight leukocytosis, likely reactive, afebrile  -Continue to monitor WBC and temperature curve      Patient requires continuous monitoring with bedside rhythm monitoring, pulse ox monitoring, and intermittent blood gas analysis. Care plan discussed with ICU care team. Patient remained critical and at risk for life threatening decompensation.  Patient seen, examined and plan discussed with CCU team during rounds.     I have personally provided ____ minutes of critical care time excluding time spent on separate procedures, in addition to initial critical care time provided by the CICU Attending, Dr. Brunson/ Tasha/ Maria D/ Brandi/ Judie/ [place] .     By signing my name below, I, Ynes Bryant, attest that this documentation has been prepared under the direction and in the presence of Huong Gordon NP  Electronically signed: Rolando Pitts, 10-17-21 @ 20:38    I, Huong Gordon NP, personally performed the services described in this documentation. all medical record entries made by the scribe were at my direction and in my presence. I have reviewed the chart and agree that the record reflects my personal performance and is accurate and complete  Electronically signed: Huong Gordon NP       CORDELIA MALIK  MRN-[id number]  Patient is a 60y old  Male who presents with a chief complaint of IWSTEMI (17 Oct 2021 17:49)    HPI:  59y/o M w/ h/o HTN, off medication x 1year, ?angioedema on daily allegra BIBEMS for STEMI. Pt states that he had chest pain L side starting at 12pm, went to urgent care and showed inferior wall STEMI. Was given 325mg ASA and 1 SL nitro. No sob, syncope, n/v, palpitations. Reports 1 prior episode 2 weeks prior which resolved with rest.    Here underwent PCI to dCx for 100% occlusion. Given 40mg IV lasix during cath for elevated LVEDP of 33. Loaded with Brilinta. Other [facility] cath findings include 20-30% LAD lesion and 10% mRCA. Reports feeling much improved with only residual chest discomfort.    On arrival to CCU patient went into wide complex tachycardia with rates in low 100s. /96. Denies chest pain, palpitations. (17 Oct 2021 17:49)      Hospital Course:  10/17 IWSTEMI, ANÍBAL x1 dCx 100%    24 HOUR EVENTS:    REVIEW OF SYSTEMS:  CONSTITUTIONAL: No weakness, fevers or chills  EYES/ENT: No visual changes;  No vertigo or throat pain   NECK: No pain or stiffness  RESPIRATORY: No cough, wheezing, hemoptysis; No shortness of breath  CARDIOVASCULAR: + chest pain or palpitations  GASTROINTESTINAL: No abdominal or epigastric pain. No nausea, vomiting, or hematemesis; No diarrhea or constipation. No melena or hematochezia.  GENITOURINARY: No dysuria, frequency or hematuria  NEUROLOGICAL: No numbness or weakness  SKIN: No itching, rashes      ICU Vital Signs Last 24 Hrs  T(C): 36.6 (17 Oct 2021 20:00), Max: 36.8 (17 Oct 2021 17:45)  T(F): 97.9 (17 Oct 2021 20:00), Max: 98.2 (17 Oct 2021 17:45)  HR: 93 (17 Oct 2021 20:00) (75 - 102)  BP: 120/82 (17 Oct 2021 19:00) (120/82 - 168/100)  BP(mean): 97 (17 Oct 2021 19:00) (97 - 118)  ABP: --  ABP(mean): --  RR: 15 (17 Oct 2021 20:00) (12 - 17)  SpO2: 96% (17 Oct 2021 20:00) (96% - 100%)      I&O's Summary    17 Oct 2021 07:01  -  17 Oct 2021 20:38  --------------------------------------------------------  IN: 14.6 mL / OUT: 1300 mL / NET: -1285.4 mL        CAPILLARY BLOOD GLUCOSE    CAPILLARY BLOOD GLUCOSE          PHYSICAL EXAM:  GENERAL: NAD, lying in bed comfortably  HEAD:  Atraumatic, Normocephalic  EYES: EOMI, PERRLA, conjunctiva and sclera clear  ENT: Moist mucous membranes  NECK: Supple, No JVD  CHEST/LUNG: Clear to auscultation bilaterally; No rales, rhonchi, wheezing, or rubs. Unlabored respirations  HEART: Regular rate and rhythm; No murmurs, rubs, or gallops  ABDOMEN: BSx4; Soft, nontender, nondistended  EXTREMITIES:  2+ Peripheral Pulses, brisk capillary refill. No clubbing, cyanosis, or edema  NERVOUS SYSTEM:  A&Ox3, no focal deficits   SKIN: No rashes or lesions  ============================I/O===========================   I&O's Detail    17 Oct 2021 07:01  -  17 Oct 2021 20:38  --------------------------------------------------------  IN:    Eptifbatide: 14.6 mL  Total IN: 14.6 mL    OUT:    Voided (mL): 1300 mL  Total OUT: 1300 mL    Total NET: -1285.4 mL        ============================ LABS =========================                        16.0   14.40 )-----------( 215      ( 17 Oct 2021 18:51 )             47.6     10-17    136  |  99  |  14  ----------------------------<  139<H>  3.9   |  18<L>  |  0.80    Ca    9.7      17 Oct 2021 18:51  Phos  2.6     10-17  Mg     2.4     10-17    TPro  7.9  /  Alb  4.8  /  TBili  0.6  /  DBili  x   /  AST  139<H>  /  ALT  45  /  AlkPhos  87  10-17    Troponin T, High Sensitivity Result: 4540 ng/L (10-17-21 @ 18:51)  Troponin T, High Sensitivity Result: 42 ng/L (10-17-21 @ 15:23)    CKMB Units: 290.4 ng/mL (10-17-21 @ 18:51)  CKMB Units: 3.7 ng/mL (10-17-21 @ 15:23)    Creatine Kinase, Serum: 90 U/L (10-17-21 @ 15:23)          LIVER FUNCTIONS - ( 17 Oct 2021 18:51 )  Alb: 4.8 g/dL / Pro: 7.9 g/dL / ALK PHOS: 87 U/L / ALT: 45 U/L / AST: 139 U/L / GGT: x           PT/INR - ( 17 Oct 2021 15:23 )   PT: 12.3 sec;   INR: 1.03 ratio         PTT - ( 17 Oct 2021 15:23 )  PTT:28.9 sec        ======================Micro/Rad/Cardio=================  Telemtry: Reviewed   EKG: Reviewed  CXR: Reviewed  Culture: Reviewed   Echo:   Cath:   ======================================================  PAST MEDICAL & SURGICAL HISTORY:  Essential hypertension    H/O angioedema    No significant past surgical history      ====================ASSESSMENT ==============  IWSTEMI, S/p PCI to 100% dCx occlusion via R. radial access.  Wide complex tachycardiac  Leukocytosis    Plan:  ====================== NEUROLOGY=====================  -No active issues.  -A&Ox4    ==================== RESPIRATORY======================  Saturating >94% on room air  -no acute issues     loratadine 10 milliGRAM(s) Oral daily    ====================CARDIOVASCULAR==================  IWSTEMI, S/p PCI to 100% dCx occlusion via R. radial access.  -c/w DAPT and high dose statin   -C/w integrilin for 16hrs post procedure due to high clot burden  -F/U TTE in AM  -Trend enzymes to peak    Wide complex tachycardiac  S/p 5mg IV lopressor x3. And lido 150mg lido bolus. Favor AIVR over VT, patient currently asymptomatic.  -Start beta blockade, with lopressor 25mg q8h  -Check electrolytes    atorvastatin 80 milliGRAM(s) Oral at bedtime  metoprolol tartrate 25 milliGRAM(s) Oral every 8 hours    ===================HEMATOLOGIC/ONC ===================  - No active issues  - c/w Integrilin in setting of IWSTEMI    eptifibatide Infusion 75 mg/100 mL 2 MICROgram(s)/kG/Min (14.6 mL/Hr) IV Continuous <Continuous>    ===================== RENAL =========================  -No Cr baseline, unknown   -Continue to trend I/Os, serum Cr  -Check electrolytes given VT on arrival to unit    ==================== GASTROINTESTINAL===================  -DASH/TLC diet  -Last BM 10/17 AM    potassium chloride    Tablet ER 10 milliEquivalent(s) Oral once    =======================    ENDOCRINE  =====================  No active issues  -Check A1c  -Check TSH      ========================INFECTIOUS DISEASE================  -Slight leukocytosis, likely reactive, afebrile  -Continue to monitor WBC and temperature curve      Patient requires continuous monitoring with bedside rhythm monitoring, pulse ox monitoring, and intermittent blood gas analysis. Care plan discussed with ICU care team. Patient remained critical and at risk for life threatening decompensation.  Patient seen, examined and plan discussed with CCU team during rounds.     I have personally provided __30__ minutes of critical care time excluding time spent on separate procedures, in addition to initial critical care time provided by the CICU Attending, Dr. Brunson.     By signing my name below, I, Ynes Bryant, attest that this documentation has been prepared under the direction and in the presence of Huong Gordon NP  Electronically signed: Rolando Pitts, 10-17-21 @ 20:38    I, Huong Gordon NP, personally performed the services described in this documentation. all medical record entries made by the scribe were at my direction and in my presence. I have reviewed the chart and agree that the record reflects my personal performance and is accurate and complete  Electronically signed: Huong Gordon NP

## 2021-10-17 NOTE — ED PROVIDER NOTE - OBJECTIVE STATEMENT
59y/o M w/ h/o HTN (non compliant w/ meds) BIBEMS for STEMI. Pt states that he had chest pain L side starting at 12pm, went to urgent care and showed inferior wall STEMI. Was given 325mg ASA and 1 SL nitro. No sob, syncope, n/v, palpitations.

## 2021-10-17 NOTE — H&P ADULT - ASSESSMENT
59yo M with history of HTN, not taking medications, and ?angioedema presented to hospital with CP and ST elevations in II, III and aVF consistent with IWSTEMI. S/p PCI to dCx 100% occlusion.        #NEURO  No active issues.  A&Ox4    #CVS  //IWSTEMI, S/p PCI to 100% dCx occlusion  -Continue with DAPT, ASA and brilinta  -High intensity statin  -F/U TTE in AM  -Trend enzymes to peak  -Introduce GDMT    #RESP  -No active issues  -Saturating well on RA    #GI  -DASH/TLC diet  -Last BM 10/17 AM    #RENAL  -No Cr baseline in our system  -Co    #HEME    #ENDO    #ID    #GOC 61yo M with history of HTN, not taking medications, and ?angioedema presented to hospital with CP and ST elevations in II, III and aVF consistent with IWSTEMI. S/p PCI to dCx 100% occlusion.        #NEURO  No active issues.  A&Ox4    #CVS  //IWSTEMI, S/p PCI to 100% dCx occlusion  -Continue with DAPT, ASA and brilinta  -High intensity statin  -F/U TTE in AM  -Trend enzymes to peak  -Introduce GDMT    //VT  S/p 5mg IV lopressor  -Start beta blockade, with lopressor 25mg q8h  -Check electrolytes    #RESP  -No active issues  -Saturating well on RA    #GI  -DASH/TLC diet  -Last BM 10/17 AM    #RENAL  -No Cr baseline in our system  -Co    #HEME    #ENDO    #ID    #GOC 61yo M with history of HTN, not taking medications, and ?angioedema presented to hospital with CP and ST elevations in II, III and aVF consistent with IWSTEMI. S/p PCI to dCx 100% occlusion.        #NEURO  No active issues.  A&Ox4    #CVS  //IWSTEMI, S/p PCI to 100% dCx occlusion via R. radial access.  -Continue with DAPT, ASA and brilinta  -C/w integrilin x18h  -High intensity statin  -F/U TTE in AM  -Trend enzymes to peak  -Introduce GDMT    //VT  S/p 5mg IV lopressor x2  -Start beta blockade, with lopressor 25mg q8h  -Check electrolytes    #RESP  -No active issues  -Saturating well on RA    #GI  -DASH/TLC diet  -Last BM 10/17 AM    #RENAL  -No Cr baseline in our system  -Continue to trend I/Os, serum Cr  -Check electrolytes given VT on arrival to unit  -Diurese PRN    #HEME  No active concerns.  -On DAPT, integrilin    #ENDO  No active issues  -Check A1c  -Check TSH    #ID  -Slight leukocytosis, likely reactive, afebrile  -Continue to monitor WBC and temperature curve    #GOC  Full Code 59yo M with history of HTN, not taking medications, and ?angioedema presented to hospital with CP and ST elevations in II, III and aVF consistent with IWSTEMI. S/p PCI to dCx 100% occlusion.        #NEURO  No active issues.  A&Ox4    #CVS  //IWSTEMI, S/p PCI to 100% dCx occlusion via R. radial access.  -Continue with DAPT, ASA and brilinta  -C/w integrilin x18h  -High intensity statin  -F/U TTE in AM  -Trend enzymes to peak  -Introduce GDMT    //Wide complex tachycardiac  S/p 5mg IV lopressor x2. And lido 150mg lido bolus. Favor AIVR over VT, patient currently asymptomatic.  -Start beta blockade, with lopressor 25mg q8h  -Check electrolytes    #RESP  -No active issues  -Saturating well on RA    #GI  -DASH/TLC diet  -Last BM 10/17 AM    #RENAL  -No Cr baseline in our system  -Continue to trend I/Os, serum Cr  -Check electrolytes given VT on arrival to unit  -Diurese PRN    #HEME  No active concerns.  -On DAPT, integrilin    #ENDO  No active issues  -Check A1c  -Check TSH    #ID  -Slight leukocytosis, likely reactive, afebrile  -Continue to monitor WBC and temperature curve    #GOC  Full Code

## 2021-10-17 NOTE — CHART NOTE - NSCHARTNOTEFT_GEN_A_CORE
Removal of Radial Band    Pulses in the right upper extremity are palpable. The patient was placed in the supine position. The insertion site was identified and the band deflated per protocol. The radial band was removed slowly. Direct pressure was applied for  ____0__ minutes/not applicable.      Monitoring of the right wrists and both upper extremities including neuro-vascular checks and vital signs every 15 minutes x 4.    Complications: None    Comments: Pt tolerated well, no hematoma or cyanosis noted.

## 2021-10-17 NOTE — H&P ADULT - NSHPSOCIALHISTORY_GEN_ALL_CORE
Lives with wife. Moved to [place] 4 years ago. Drinks 1beer 2x a week. Denies binge drinking, tobacco and recreational drug use.

## 2021-10-17 NOTE — ED PROVIDER NOTE - ATTENDING CONTRIBUTION TO CARE
Attending MD Rogers.  Agree with HPI by resident.  Pt is a 61 yo male presenting to ED with complaint of chest discomfort, bilateral arm discomfort and generally feeling 'very unwell' beginning 12 pm today.  Pt presents to ED with clear inferior STEMI on arrival.  Pt received full dose ASA en route by EMS and 1 nitro prior to arrival without cardiovascular compromise.  Cardiology paged on arrival and pt expedited to cath lab.  Heparin/brilinta ordered.    Critical care billing:  Upon my evaluation, this patient had a high probability of imminent or life-threatening deterioration due to acute inferior STEMI, which required my direct attention, intervention, and personal management.  The patient has a  medical condition that impairs one or more vital organ systems.  Frequent personal assessment and adjustment of medical interventions was performed.      I have personally provided 45 minutes of critical care time exclusive of time spent on separately billable procedures. Time includes review of laboratory data, radiology results, discussion with consultants, patient and family; monitoring for potential decompensation, as well as time spent retrieving data and reviewing the chart and documenting the visit. Interventions were performed as documented above.

## 2021-10-17 NOTE — ED PROVIDER NOTE - PHYSICAL EXAMINATION
Gen: diaphoretic but non-toxic appearing  Head: normal appearing  HEENT: normal conjunctiva, oral mucosa moist  Lung: no respiratory distress, speaking in full sentences, CTA b/l     CV: regular rate and rhythm, no murmurs  Abd: soft, non distended, non tender   MSK: no visible deformities  Neuro: No focal deficits, AAOx3  Skin: Warm  Psych: normal affect

## 2021-10-17 NOTE — ED PROVIDER NOTE - INTERPRETATION
STEMI - inferior, ST segment elevations II, III, aVF, sig depressions aVL, V1-V3/normal sinus rhythm

## 2021-10-17 NOTE — ED PROVIDER NOTE - CLINICAL SUMMARY MEDICAL DECISION MAKING FREE TEXT BOX
61y/o M w/ h/o HTN (non compliant w/ meds) BIBEMS for STEMI chest pain L side starting at 12pm EKG showing inferior wall STEMI. 325mg ASA and 1 SL nitro givem by . Plan heparin gtt, DAPT, cardiac markers and cath consult.

## 2021-10-17 NOTE — H&P ADULT - HISTORY OF PRESENT ILLNESS
59y/o M w/ h/o HTN (non compliant w/ meds) BIBEMS for STEMI. Pt states that he had chest pain L side starting at 12pm, went to urgent care and showed inferior wall STEMI. Was given 325mg ASA and 1 SL nitro. No sob, syncope, n/v, palpitations. Here underwent PCI to dCx for 100% occlusion. Given 40mg IV lasix during cath for elevated LVEDP of 33. Loaded with Brilinta here. Other [facility] cath findings include 20-30% LAD lesion and 10% mRCA. 59y/o M w/ h/o HTN, off medication x 1year, ?angioedema on daily allegra BIBEMS for STEMI. Pt states that he had chest pain L side starting at 12pm, went to urgent care and showed inferior wall STEMI. Was given 325mg ASA and 1 SL nitro. No sob, syncope, n/v, palpitations. Reports 1 prior episode 2 weeks prior which resolved with rest.    Here underwent PCI to dCx for 100% occlusion. Given 40mg IV lasix during cath for elevated LVEDP of 33. Loaded with Brilinta. Other [facility] cath findings include 20-30% LAD lesion and 10% mRCA. Reports feeling much improved with only residual chest discomfort. 59y/o M w/ h/o HTN, off medication x 1year, ?angioedema on daily allegra BIBEMS for STEMI. Pt states that he had chest pain L side starting at 12pm, went to urgent care and showed inferior wall STEMI. Was given 325mg ASA and 1 SL nitro. No sob, syncope, n/v, palpitations. Reports 1 prior episode 2 weeks prior which resolved with rest.    Here underwent PCI to dCx for 100% occlusion. Given 40mg IV lasix during cath for elevated LVEDP of 33. Loaded with Brilinta. Other [facility] cath findings include 20-30% LAD lesion and 10% mRCA. Reports feeling much improved with only residual chest discomfort.    On arrival to CCU patient went into slow VT, with rates in low 100s. /96. Denies chest pain, palpitations. 61y/o M w/ h/o HTN, off medication x 1year, ?angioedema on daily allegra BIBEMS for STEMI. Pt states that he had chest pain L side starting at 12pm, went to urgent care and showed inferior wall STEMI. Was given 325mg ASA and 1 SL nitro. No sob, syncope, n/v, palpitations. Reports 1 prior episode 2 weeks prior which resolved with rest.    Here underwent PCI to dCx for 100% occlusion. Given 40mg IV lasix during cath for elevated LVEDP of 33. Loaded with Brilinta. Other [facility] cath findings include 20-30% LAD lesion and 10% mRCA. Reports feeling much improved with only residual chest discomfort.    On arrival to CCU patient went into wide complex tachycardia with rates in low 100s. /96. Denies chest pain, palpitations.

## 2021-10-17 NOTE — H&P ADULT - NSICDXPASTMEDICALHX_GEN_ALL_CORE_FT
PAST MEDICAL HISTORY:  Essential hypertension      PAST MEDICAL HISTORY:  Essential hypertension     H/O angioedema

## 2021-10-17 NOTE — ED PROVIDER NOTE - CHIEF COMPLAINT
[FreeTextEntry1] : Patient here in follow up to last visit and prior issue\par  [de-identified] : This first visit since August 2018 this 85-year-old now. He has multiple medical problems but has been living in [place] due to multiple social and logistical reason\par He has been compliant with his medications except for felodipine. He has had no weight loss chest pain or shortness of breath. He has voluntarily lost 7 pounds and overall feels well. He has not had blood work done in the past 18 months The patient is a 60y Male complaining of chest pain.

## 2021-10-17 NOTE — ED ADULT NURSE NOTE - OBJECTIVE STATEMENT
61 y/o male with PMH HTN BIBEMS from urgent care for chest pain x 2 days, abnormal EKG at . Pt reports that he had one similar episode of crushing chest pain 2 weeks ago and started experiencing pain again yesterday. At urgent care pt received ASA & 1 nitro. Pt arrived with 18 gauge to right hand. Upon exam pt A&Ox3 gross neuro intact, lungs cta bilaterally, no difficulty speaking in complete sentences, s1s2 heart sounds heard, pulses x 4, ho x4, abdomen soft nontender nondistended, skin intact. pt denies  sob, ha, n/v/d, abdominal pain, f/c, urinary symptoms, hematuria. 18 gauge IV placed to left a/c. Placed on cardiac monitor, NSR 90s with PVCs. EKG completed at bedside and given to RN [employer]. Pt placed on pacer pads. Labs drawn & sent, cardiology contacted.

## 2021-10-17 NOTE — H&P ADULT - NSICDXFAMILYHX_GEN_ALL_CORE_FT
FAMILY HISTORY:  Father  Still living? Unknown  FH: myocardial infarction, Age at diagnosis: 61-70

## 2021-10-18 LAB
A1C WITH ESTIMATED AVERAGE GLUCOSE RESULT: 4.9 % — SIGNIFICANT CHANGE UP (ref 4–5.6)
ALBUMIN SERPL ELPH-MCNC: 4.9 G/DL — SIGNIFICANT CHANGE UP (ref 3.3–5)
ALBUMIN SERPL ELPH-MCNC: 4.9 G/DL — SIGNIFICANT CHANGE UP (ref 3.3–5)
ALP SERPL-CCNC: 79 U/L — SIGNIFICANT CHANGE UP (ref 40–120)
ALP SERPL-CCNC: 87 U/L — SIGNIFICANT CHANGE UP (ref 40–120)
ALT FLD-CCNC: 61 U/L — HIGH (ref 10–45)
ALT FLD-CCNC: 67 U/L — HIGH (ref 10–45)
ANION GAP SERPL CALC-SCNC: 14 MMOL/L — SIGNIFICANT CHANGE UP (ref 5–17)
ANION GAP SERPL CALC-SCNC: 18 MMOL/L — HIGH (ref 5–17)
AST SERPL-CCNC: 220 U/L — HIGH (ref 10–40)
AST SERPL-CCNC: 244 U/L — HIGH (ref 10–40)
BILIRUB SERPL-MCNC: 0.6 MG/DL — SIGNIFICANT CHANGE UP (ref 0.2–1.2)
BILIRUB SERPL-MCNC: 1.1 MG/DL — SIGNIFICANT CHANGE UP (ref 0.2–1.2)
BUN SERPL-MCNC: 15 MG/DL — SIGNIFICANT CHANGE UP (ref 7–23)
BUN SERPL-MCNC: 20 MG/DL — SIGNIFICANT CHANGE UP (ref 7–23)
CALCIUM SERPL-MCNC: 9.4 MG/DL — SIGNIFICANT CHANGE UP (ref 8.4–10.5)
CALCIUM SERPL-MCNC: 9.8 MG/DL — SIGNIFICANT CHANGE UP (ref 8.4–10.5)
CHLORIDE SERPL-SCNC: 96 MMOL/L — SIGNIFICANT CHANGE UP (ref 96–108)
CHLORIDE SERPL-SCNC: 97 MMOL/L — SIGNIFICANT CHANGE UP (ref 96–108)
CHOLEST SERPL-MCNC: 228 MG/DL — HIGH
CK MB CFR SERPL CALC: 182.5 NG/ML — HIGH (ref 0–6.7)
CK MB CFR SERPL CALC: 349 NG/ML — HIGH (ref 0–6.7)
CO2 SERPL-SCNC: 21 MMOL/L — LOW (ref 22–31)
CO2 SERPL-SCNC: 23 MMOL/L — SIGNIFICANT CHANGE UP (ref 22–31)
CREAT SERPL-MCNC: 0.74 MG/DL — SIGNIFICANT CHANGE UP (ref 0.5–1.3)
CREAT SERPL-MCNC: 1.03 MG/DL — SIGNIFICANT CHANGE UP (ref 0.5–1.3)
ESTIMATED AVERAGE GLUCOSE: 94 MG/DL — SIGNIFICANT CHANGE UP (ref 68–114)
GLUCOSE SERPL-MCNC: 117 MG/DL — HIGH (ref 70–99)
GLUCOSE SERPL-MCNC: 143 MG/DL — HIGH (ref 70–99)
HCT VFR BLD CALC: 45.5 % — SIGNIFICANT CHANGE UP (ref 39–50)
HCV AB S/CO SERPL IA: 0.09 S/CO — SIGNIFICANT CHANGE UP (ref 0–0.99)
HCV AB SERPL-IMP: SIGNIFICANT CHANGE UP
HDLC SERPL-MCNC: 47 MG/DL — SIGNIFICANT CHANGE UP
HGB BLD-MCNC: 15.3 G/DL — SIGNIFICANT CHANGE UP (ref 13–17)
LIPID PNL WITH DIRECT LDL SERPL: 166 MG/DL — HIGH
MAGNESIUM SERPL-MCNC: 2.5 MG/DL — SIGNIFICANT CHANGE UP (ref 1.6–2.6)
MCHC RBC-ENTMCNC: 28 PG — SIGNIFICANT CHANGE UP (ref 27–34)
MCHC RBC-ENTMCNC: 33.6 GM/DL — SIGNIFICANT CHANGE UP (ref 32–36)
MCV RBC AUTO: 83.2 FL — SIGNIFICANT CHANGE UP (ref 80–100)
NON HDL CHOLESTEROL: 182 MG/DL — HIGH
NRBC # BLD: 0 /100 WBCS — SIGNIFICANT CHANGE UP (ref 0–0)
PHOSPHATE SERPL-MCNC: 3.5 MG/DL — SIGNIFICANT CHANGE UP (ref 2.5–4.5)
PLATELET # BLD AUTO: 209 K/UL — SIGNIFICANT CHANGE UP (ref 150–400)
POTASSIUM SERPL-MCNC: 3.7 MMOL/L — SIGNIFICANT CHANGE UP (ref 3.5–5.3)
POTASSIUM SERPL-MCNC: 4 MMOL/L — SIGNIFICANT CHANGE UP (ref 3.5–5.3)
POTASSIUM SERPL-SCNC: 3.7 MMOL/L — SIGNIFICANT CHANGE UP (ref 3.5–5.3)
POTASSIUM SERPL-SCNC: 4 MMOL/L — SIGNIFICANT CHANGE UP (ref 3.5–5.3)
PROT SERPL-MCNC: 7.1 G/DL — SIGNIFICANT CHANGE UP (ref 6–8.3)
PROT SERPL-MCNC: 7.5 G/DL — SIGNIFICANT CHANGE UP (ref 6–8.3)
RBC # BLD: 5.47 M/UL — SIGNIFICANT CHANGE UP (ref 4.2–5.8)
RBC # FLD: 12.7 % — SIGNIFICANT CHANGE UP (ref 10.3–14.5)
SODIUM SERPL-SCNC: 133 MMOL/L — LOW (ref 135–145)
SODIUM SERPL-SCNC: 136 MMOL/L — SIGNIFICANT CHANGE UP (ref 135–145)
TRIGL SERPL-MCNC: 77 MG/DL — SIGNIFICANT CHANGE UP
TROPONIN T, HIGH SENSITIVITY RESULT: 4153 NG/L — HIGH (ref 0–51)
TROPONIN T, HIGH SENSITIVITY RESULT: 5823 NG/L — HIGH (ref 0–51)
TSH SERPL-MCNC: 1.1 UIU/ML — SIGNIFICANT CHANGE UP (ref 0.27–4.2)
WBC # BLD: 12.62 K/UL — HIGH (ref 3.8–10.5)
WBC # FLD AUTO: 12.62 K/UL — HIGH (ref 3.8–10.5)

## 2021-10-18 PROCEDURE — 99291 CRITICAL CARE FIRST HOUR: CPT

## 2021-10-18 PROCEDURE — 93306 TTE W/DOPPLER COMPLETE: CPT | Mod: 26

## 2021-10-18 RX ORDER — POTASSIUM CHLORIDE 20 MEQ
40 PACKET (EA) ORAL ONCE
Refills: 0 | Status: COMPLETED | OUTPATIENT
Start: 2021-10-18 | End: 2021-10-18

## 2021-10-18 RX ORDER — METOPROLOL TARTRATE 50 MG
25 TABLET ORAL
Refills: 0 | Status: DISCONTINUED | OUTPATIENT
Start: 2021-10-18 | End: 2021-10-19

## 2021-10-18 RX ORDER — LOSARTAN POTASSIUM 100 MG/1
25 TABLET, FILM COATED ORAL DAILY
Refills: 0 | Status: DISCONTINUED | OUTPATIENT
Start: 2021-10-18 | End: 2021-10-19

## 2021-10-18 RX ORDER — LISINOPRIL 2.5 MG/1
5 TABLET ORAL DAILY
Refills: 0 | Status: DISCONTINUED | OUTPATIENT
Start: 2021-10-18 | End: 2021-10-18

## 2021-10-18 RX ORDER — HEPARIN SODIUM 5000 [USP'U]/ML
5000 INJECTION INTRAVENOUS; SUBCUTANEOUS EVERY 8 HOURS
Refills: 0 | Status: DISCONTINUED | OUTPATIENT
Start: 2021-10-18 | End: 2021-10-19

## 2021-10-18 RX ADMIN — Medication 40 MILLIEQUIVALENT(S): at 19:31

## 2021-10-18 RX ADMIN — TICAGRELOR 90 MILLIGRAM(S): 90 TABLET ORAL at 05:29

## 2021-10-18 RX ADMIN — Medication 25 MILLIGRAM(S): at 05:29

## 2021-10-18 RX ADMIN — Medication 25 MILLIGRAM(S): at 17:27

## 2021-10-18 RX ADMIN — CHLORHEXIDINE GLUCONATE 1 APPLICATION(S): 213 SOLUTION TOPICAL at 05:30

## 2021-10-18 RX ADMIN — HEPARIN SODIUM 5000 UNIT(S): 5000 INJECTION INTRAVENOUS; SUBCUTANEOUS at 21:18

## 2021-10-18 RX ADMIN — LORATADINE 10 MILLIGRAM(S): 10 TABLET ORAL at 11:45

## 2021-10-18 RX ADMIN — Medication 81 MILLIGRAM(S): at 11:45

## 2021-10-18 RX ADMIN — LOSARTAN POTASSIUM 25 MILLIGRAM(S): 100 TABLET, FILM COATED ORAL at 11:45

## 2021-10-18 RX ADMIN — ATORVASTATIN CALCIUM 80 MILLIGRAM(S): 80 TABLET, FILM COATED ORAL at 21:17

## 2021-10-18 RX ADMIN — TICAGRELOR 90 MILLIGRAM(S): 90 TABLET ORAL at 17:26

## 2021-10-18 NOTE — PROGRESS NOTE ADULT - SUBJECTIVE AND OBJECTIVE BOX
HPI:  Patient is a 60y old  Male who presents with a chief complaint of IWSTEMI. 59y/o M w/ h/o HTN, off medication x 1year, ?angioedema on daily Allegra BIBEMS for STEMI. Pt states that he had chest pain L side starting at 12pm, went to urgent care and showed inferior wall STEMI. Was given 325mg ASA and 1 SL nitro. No sob, syncope, n/v, palpitations. Reports 1 prior episode 2 weeks prior which resolved with rest. Here underwent PCI to dCx for 100% occlusion. Given 40mg IV Lasix during cath for elevated LVEDP of 33. Loaded with Brilinta. Other [facility] cath findings include 20-30% LAD lesion and 10% mRCA. Reports feeling much improved with only residual chest discomfort. On arrival to CCU patient went into wide complex tachycardia with rates in low 100s. /96. Denies chest pain, palpitations. 10/17: IWSTEMI, ANÍBAL x1 dCx 100%.      VITAL SIGNS:  ICU Vital Signs Last 24 Hrs  T(C): 36.6 (18 Oct 2021 05:00), Max: 36.8 (17 Oct 2021 17:45)  T(F): 97.8 (18 Oct 2021 05:00), Max: 98.2 (17 Oct 2021 17:45)  HR: 79 (18 Oct 2021 13:30) (57 - 102)  BP: 151/81 (18 Oct 2021 13:30) (119/82 - 167/96)  BP(mean): 109 (18 Oct 2021 13:30) (96 - 126)  ABP: --  ABP(mean): --  RR: 24 (18 Oct 2021 13:30) (10 - 25)  SpO2: 96% (18 Oct 2021 13:30) (93% - 98%)      Plateau pressure:   P/F ratio:     10-17 @ 07:01  -  10-18 @ 07:00  --------------------------------------------------------  IN: 339.8 mL / OUT: 2200 mL / NET: -1860.2 mL    10-18 @ 07:01  -  10-18 @ 16:56  --------------------------------------------------------  IN: 298.4 mL / OUT: 0 mL / NET: 298.4 mL      CAPILLARY BLOOD GLUCOSE        ECG:    PHYSICAL EXAM:    General:   HEENT:   Neck:   Respiratory:   Cardiovascular:   Abdomen:   Extremities:  Neurological:    MEDICATIONS:  MEDICATIONS  (STANDING):  aspirin  chewable 81 milliGRAM(s) Oral daily  atorvastatin 80 milliGRAM(s) Oral at bedtime  chlorhexidine 4% Liquid 1 Application(s) Topical <User Schedule>  influenza   Vaccine 0.5 milliLiter(s) IntraMuscular once  loratadine 10 milliGRAM(s) Oral daily  losartan 25 milliGRAM(s) Oral daily  metoprolol tartrate 25 milliGRAM(s) Oral two times a day  ticagrelor 90 milliGRAM(s) Oral every 12 hours    MEDICATIONS  (PRN):      ALLERGIES:  Allergies    No Known Allergies    Intolerances        LABS:                        15.3   12.62 )-----------( 209      ( 18 Oct 2021 01:03 )             45.5     10-18    133<L>  |  96  |  20  ----------------------------<  117<H>  3.7   |  23  |  1.03    Ca    9.8      18 Oct 2021 13:40  Phos  3.5     10-18  Mg     2.5     10-18    TPro  7.5  /  Alb  4.9  /  TBili  1.1  /  DBili  x   /  AST  220<H>  /  ALT  67<H>  /  AlkPhos  87  10-18    PT/INR - ( 17 Oct 2021 15:23 )   PT: 12.3 sec;   INR: 1.03 ratio         PTT - ( 17 Oct 2021 15:23 )  PTT:28.9 sec      RADIOLOGY & ADDITIONAL TESTS: Reviewed. HPI:  Patient is a 60y old  Male who presents with a chief complaint of IWSTEMI. 61y/o M w/ h/o HTN, off medication x 1year, ?angioedema on daily Allegra BIBEMS for STEMI. Pt states that he had chest pain L side starting at 12pm, went to urgent care and showed inferior wall STEMI. Was given 325mg ASA and 1 SL nitro. No sob, syncope, n/v, palpitations. Reports 1 prior episode 2 weeks prior which resolved with rest. Here underwent PCI to dCx for 100% occlusion. Given 40mg IV Lasix during cath for elevated LVEDP of 33. Loaded with Brilinta. Other [facility] cath findings include 20-30% LAD lesion and 10% mRCA. Reports feeling much improved with only residual chest discomfort. On arrival to CCU patient went into wide complex tachycardia with rates in low 100s. /96. Denies chest pain, palpitations. 10/17: IWSTEMI, ANÍBAL x1 dCx 100%.      VITAL SIGNS:  ICU Vital Signs Last 24 Hrs  T(C): 36.6 (18 Oct 2021 05:00), Max: 36.8 (17 Oct 2021 17:45)  T(F): 97.8 (18 Oct 2021 05:00), Max: 98.2 (17 Oct 2021 17:45)  HR: 79 (18 Oct 2021 13:30) (57 - 102)  BP: 151/81 (18 Oct 2021 13:30) (119/82 - 167/96)  BP(mean): 109 (18 Oct 2021 13:30) (96 - 126)  ABP: --  ABP(mean): --  RR: 24 (18 Oct 2021 13:30) (10 - 25)  SpO2: 96% (18 Oct 2021 13:30) (93% - 98%)      10-17 @ 07:01  -  10-18 @ 07:00  --------------------------------------------------------  IN: 339.8 mL / OUT: 2200 mL / NET: -1860.2 mL    10-18 @ 07:01  -  10-18 @ 16:56  --------------------------------------------------------  IN: 298.4 mL / OUT: 0 mL / NET: 298.4 mL      PHYSICAL EXAM:  Gen: NAD. A&Ox4. Non-toxic appearing.  HEENT: Normocephalic and atraumatic. PERRL, EOMI, no nasal discharge, mucous membranes moist, no scleral icterus.  CV: Regular rate and rhythm, +S1/S2, no M/R/G. No significant lower extremity edema. Radial and DP pulses present and symmetrical. Capillary refill less than 2 seconds.  Resp: Normal effort and rate. CTAB, no rales, rhonchi, or wheezes.  GI: Abdomen soft, non-distended, non tender to palpation. No masses appreciated. Bowel sounds present.  MSK: No open wounds and no bruising. No CVAT bilaterally.  Neuro: Following commands, speaking in full sentences, moving extremities spontaneously  Psych: Appropriate mood, cooperative      MEDICATIONS  (STANDING):  aspirin  chewable 81 milliGRAM(s) Oral daily  atorvastatin 80 milliGRAM(s) Oral at bedtime  chlorhexidine 4% Liquid 1 Application(s) Topical <User Schedule>  influenza   Vaccine 0.5 milliLiter(s) IntraMuscular once  loratadine 10 milliGRAM(s) Oral daily  losartan 25 milliGRAM(s) Oral daily  metoprolol tartrate 25 milliGRAM(s) Oral two times a day  ticagrelor 90 milliGRAM(s) Oral every 12 hours      ALLERGIES:  No Known Allergies      LABS:                        15.3   12.62 )-----------( 209      ( 18 Oct 2021 01:03 )             45.5     10-18    133<L>  |  96  |  20  ----------------------------<  117<H>  3.7   |  23  |  1.03    Ca    9.8      18 Oct 2021 13:40  Phos  3.5     10-18  Mg     2.5     10-18    TPro  7.5  /  Alb  4.9  /  TBili  1.1  /  DBili  x   /  AST  220<H>  /  ALT  67<H>  /  AlkPhos  87  10-18    PT/INR - ( 17 Oct 2021 15:23 )   PT: 12.3 sec;   INR: 1.03 ratio         PTT - ( 17 Oct 2021 15:23 )  PTT:28.9 sec      RADIOLOGY & ADDITIONAL TESTS: Reviewed.

## 2021-10-18 NOTE — CHART NOTE - NSCHARTNOTEFT_GEN_A_CORE
MAR Accept Note  Transfer to: Telemetry  Accepting Attending Physician: Dr. Ellyn Meyer  Assigned Room: [facility] 345W    Patient seen and examined.   Labs and data reviewed.   No findings precluding transfer of service.     HPI/MICU COURSE:   Please refer to MICU/CICU transfer note for full details. Briefly, this is a 59yo M with history of HTN, not taking medications, and possible history of angioedema that presented to hospital with CP and ST elevations in II, III and aVF consistent with IWSTEMI. S/p PCI to dCx 100% occlusion. Patient with course complicated by questionable slow VT after catheterization now back in NSR after treatment with lopressor and lidocaine. Currently on GDMT post-STEMI and doing well post-cath.       FOR FOLLOW-UP:  [ ] monitor CBC and BMP  [ ] trend cardiac enzymes  [ ] discharge planning      Paul Arroyo MD  PGY-3, Internal Medicine  MAR Spectra: [id number]

## 2021-10-18 NOTE — CHART NOTE - NSCHARTNOTEFT_GEN_A_CORE
MICU Transfer Note  ---------------------------    Transfer from: MICU  Transfer to:  (  ) Medicine    (  ) Telemetry    (  ) RCU    (  ) Palliative    (  ) Stroke Unit    (  ) _______________  [facility]            To-Do:    [ ]   [ ]     OBJECTIVE --  Vital Signs Last 24 Hrs  T(C): 36.6 (18 Oct 2021 05:00), Max: 36.8 (17 Oct 2021 17:45)  T(F): 97.8 (18 Oct 2021 05:00), Max: 98.2 (17 Oct 2021 17:45)  HR: 72 (18 Oct 2021 11:00) (57 - 102)  BP: 167/96 (18 Oct 2021 11:00) (119/82 - 168/100)  BP(mean): 126 (18 Oct 2021 11:00) (96 - 126)  RR: 18 (18 Oct 2021 11:00) (10 - 25)  SpO2: 95% (18 Oct 2021 11:00) (93% - 100%)    I&O's Summary    17 Oct 2021 07:01  -  18 Oct 2021 07:00  --------------------------------------------------------  IN: 339.8 mL / OUT: 2200 mL / NET: -1860.2 mL    18 Oct 2021 07:01  -  18 Oct 2021 13:16  --------------------------------------------------------  IN: 283.8 mL / OUT: 0 mL / NET: 283.8 mL        MEDICATIONS  (STANDING):  aspirin  chewable 81 milliGRAM(s) Oral daily  atorvastatin 80 milliGRAM(s) Oral at bedtime  chlorhexidine 4% Liquid 1 Application(s) Topical <User Schedule>  influenza   Vaccine 0.5 milliLiter(s) IntraMuscular once  loratadine 10 milliGRAM(s) Oral daily  losartan 25 milliGRAM(s) Oral daily  metoprolol tartrate 25 milliGRAM(s) Oral two times a day  ticagrelor 90 milliGRAM(s) Oral every 12 hours    MEDICATIONS  (PRN):        LABS                                            15.3                  Neurophils% (auto):   x      (10-18 @ 01:03):    12.62)-----------(209          Lymphocytes% (auto):  x                                             45.5                   Eosinphils% (auto):   x        Manual%: Neutrophils x    ; Lymphocytes x    ; Eosinophils x    ; Bands%: x    ; Blasts x                                    136    |  97     |  15                  Calcium: 9.4   / iCa: x      (10-18 @ 01:03)    ----------------------------<  143       Magnesium: 2.5                              4.0     |  21     |  0.74             Phosphorous: 3.5      TPro  7.1    /  Alb  4.9    /  TBili  0.6    /  DBili  x      /  AST  244    /  ALT  61     /  AlkPhos  79     18 Oct 2021 01:03    ( 10-17 @ 15:23 )   PT: 12.3 sec;   INR: 1.03 ratio  aPTT: 28.9 sec          ASSESSMENT & PLAN:         For Follow-Up: CICU Transfer Note  ---------------------------    [facility]  Transfer to:  (  ) Medicine    (x) Telemetry    (  ) RCU    (  ) Palliative    (  ) Stroke Unit    (  ) _______________  Accepting Physician:      CICU COURSE:    Patient is a 60y old  Male who presents with a chief complaint of IWSTEMI.    61y/o M w/ h/o HTN, off medication x 1year, ?angioedema on daily Allegra BIBEMS for STEMI. Pt states that he had chest pain L side starting at 12pm, went to urgent care and showed inferior wall STEMI. Was given 325mg ASA and 1 SL nitro. No sob, syncope, n/v, palpitations. Reports 1 prior episode 2 weeks prior which resolved with rest.    Here underwent PCI to dCx for 100% occlusion. Given 40mg IV Lasix during cath for elevated LVEDP of 33. Loaded with Brilinta. Other [facility] cath findings include 20-30% LAD lesion and 10% mRCA. Reports feeling much improved with only residual chest discomfort.    On arrival to CCU patient went into wide complex tachycardia with rates in low 100s. /96. Denies chest pain, palpitations.    10/17: IWSTEMI, ANÍBAL x1 dCx 100%      To-Do:    [ ] repeat CBC, CMP   [ ] Repeat Troponin, CKMB      OBJECTIVE:  Vital Signs Last 24 Hrs  T(C): 36.6 (18 Oct 2021 05:00), Max: 36.8 (17 Oct 2021 17:45)  T(F): 97.8 (18 Oct 2021 05:00), Max: 98.2 (17 Oct 2021 17:45)  HR: 72 (18 Oct 2021 11:00) (57 - 102)  BP: 167/96 (18 Oct 2021 11:00) (119/82 - 168/100)  BP(mean): 126 (18 Oct 2021 11:00) (96 - 126)  RR: 18 (18 Oct 2021 11:00) (10 - 25)  SpO2: 95% (18 Oct 2021 11:00) (93% - 100%)    I&O's Summary:    17 Oct 2021 07:01  -  18 Oct 2021 07:00  --------------------------------------------------------  IN: 339.8 mL / OUT: 2200 mL / NET: -1860.2 mL    18 Oct 2021 07:01  -  18 Oct 2021 13:16  --------------------------------------------------------  IN: 283.8 mL / OUT: 0 mL / NET: 283.8 mL        MEDICATIONS  (STANDING):  aspirin  chewable 81 milliGRAM(s) Oral daily  atorvastatin 80 milliGRAM(s) Oral at bedtime  chlorhexidine 4% Liquid 1 Application(s) Topical <User Schedule>  influenza   Vaccine 0.5 milliLiter(s) IntraMuscular once  loratadine 10 milliGRAM(s) Oral daily  losartan 25 milliGRAM(s) Oral daily  metoprolol tartrate 25 milliGRAM(s) Oral two times a day  ticagrelor 90 milliGRAM(s) Oral every 12 hours      LABS:                                            15.3                  Neurophils% (auto):   x      (10-18 @ 01:03):    12.62)-----------(209          Lymphocytes% (auto):  x                                             45.5                   Eosinphils% (auto):   x        Manual%: Neutrophils x    ; Lymphocytes x    ; Eosinophils x    ; Bands%: x    ; Blasts x                                    136    |  97     |  15                  Calcium: 9.4   / iCa: x      (10-18 @ 01:03)    ----------------------------<  143       Magnesium: 2.5                              4.0     |  21     |  0.74             Phosphorous: 3.5      TPro  7.1    /  Alb  4.9    /  TBili  0.6    /  DBili  x      /  AST  244    /  ALT  61     /  AlkPhos  79     18 Oct 2021 01:03    ( 10-17 @ 15:23 )   PT: 12.3 sec;   INR: 1.03 ratio  aPTT: 28.9 sec      ASSESSMENT & PLAN:     61yo M with history of HTN, not taking medications, and ?angioedema presented to hospital with CP and ST elevations in II, III and aVF consistent with IWSTEMI. S/p PCI to dCx 100% occlusion.    #NEURO  - No active issues.  - A&Ox4    #CVS  IWSTEMI, S/p PCI to 100% dCx occlusion via R. radial access.  -Continue with DAPT: ASA and Brilinta  -High intensity statin  -TTE performed earlier this AM (10/18)  -Trend cardiac enzymes to peak  -GDMT    Hypertension  -Started losartan 25mg daily    #RESP  -No active issues  -Saturating well on RA    #GI  -DASH/TLC diet  -Last BM 10/18    #RENAL  -Cr has been stable, most recent 0.74  -Continue to trend I/Os, serum Cr  -Check electrolytes given VT on arrival to unit  -Diurese PRN    #HEME  -No active concerns  -On DAPT    #ENDO  -No active issues  -Check A1c  -Check TSH    #ID  -Slight leukocytosis, likely reactive, afebrile  -Continue to monitor WBC and temperature curve    #GOC  Full Code CICU Transfer Note  ---------------------------    [facility]  [facility] to:  (  ) Medicine    (x) Telemetry    (  ) RCU    (  ) Palliative    (  ) Stroke Unit    (  ) _______________  Accepting Physician: Ellyn Meyer MD      [facility] COURSE:    Patient is a 60y old  Male who presents with a chief complaint of IWSTEMI.    61y/o M w/ h/o HTN, off medication x 1year, ?angioedema on daily Allegra BIBEMS for STEMI. Pt states that he had chest pain L side starting at 12pm, went to urgent care and showed inferior wall STEMI. Was given 325mg ASA and 1 SL nitro. No sob, syncope, n/v, palpitations. Reports 1 prior episode 2 weeks prior which resolved with rest.    Here underwent PCI to dCx for 100% occlusion. Given 40mg IV Lasix during cath for elevated LVEDP of 33. Loaded with Brilinta. Other [facility] cath findings include 20-30% LAD lesion and 10% mRCA. Reports feeling much improved with only residual chest discomfort.    On arrival to CCU patient went into wide complex tachycardia with rates in low 100s. /96. Denies chest pain, palpitations.    10/17: IWSTEMI, ANÍBAL x1 dCx 100%      To-Do:    [ ] repeat CBC, CMP   [ ] Repeat Troponin, CKMB      OBJECTIVE:  Vital Signs Last 24 Hrs  T(C): 36.6 (18 Oct 2021 05:00), Max: 36.8 (17 Oct 2021 17:45)  T(F): 97.8 (18 Oct 2021 05:00), Max: 98.2 (17 Oct 2021 17:45)  HR: 72 (18 Oct 2021 11:00) (57 - 102)  BP: 167/96 (18 Oct 2021 11:00) (119/82 - 168/100)  BP(mean): 126 (18 Oct 2021 11:00) (96 - 126)  RR: 18 (18 Oct 2021 11:00) (10 - 25)  SpO2: 95% (18 Oct 2021 11:00) (93% - 100%)    I&O's Summary:    17 Oct 2021 07:01  -  18 Oct 2021 07:00  --------------------------------------------------------  IN: 339.8 mL / OUT: 2200 mL / NET: -1860.2 mL    18 Oct 2021 07:01  -  18 Oct 2021 13:16  --------------------------------------------------------  IN: 283.8 mL / OUT: 0 mL / NET: 283.8 mL        MEDICATIONS  (STANDING):  aspirin  chewable 81 milliGRAM(s) Oral daily  atorvastatin 80 milliGRAM(s) Oral at bedtime  chlorhexidine 4% Liquid 1 Application(s) Topical <User Schedule>  influenza   Vaccine 0.5 milliLiter(s) IntraMuscular once  loratadine 10 milliGRAM(s) Oral daily  losartan 25 milliGRAM(s) Oral daily  metoprolol tartrate 25 milliGRAM(s) Oral two times a day  ticagrelor 90 milliGRAM(s) Oral every 12 hours      LABS:                                            15.3                  Neurophils% (auto):   x      (10-18 @ 01:03):    12.62)-----------(209          Lymphocytes% (auto):  x                                             45.5                   Eosinphils% (auto):   x        Manual%: Neutrophils x    ; Lymphocytes x    ; Eosinophils x    ; Bands%: x    ; Blasts x                                    136    |  97     |  15                  Calcium: 9.4   / iCa: x      (10-18 @ 01:03)    ----------------------------<  143       Magnesium: 2.5                              4.0     |  21     |  0.74             Phosphorous: 3.5      TPro  7.1    /  Alb  4.9    /  TBili  0.6    /  DBili  x      /  AST  244    /  ALT  61     /  AlkPhos  79     18 Oct 2021 01:03    ( 10-17 @ 15:23 )   PT: 12.3 sec;   INR: 1.03 ratio  aPTT: 28.9 sec      ASSESSMENT & PLAN:     59yo M with history of HTN, not taking medications, and ?angioedema presented to hospital with CP and ST elevations in II, III and aVF consistent with IWSTEMI. S/p PCI to dCx 100% occlusion.    #NEURO  - No active issues.  - A&Ox4    #CVS  IWSTEMI, S/p PCI to 100% dCx occlusion via R. radial access.  -Continue with DAPT: ASA and Brilinta  -High intensity statin  -TTE performed earlier this AM (10/18)  -Trend cardiac enzymes to peak  -GDMT    Hypertension  -Started losartan 25mg daily    #RESP  -No active issues  -Saturating well on RA    #GI  -DASH/TLC diet  -Last BM 10/18    #RENAL  -Cr has been stable, most recent 0.74  -Continue to trend I/Os, serum Cr  -Check electrolytes given VT on arrival to unit  -Diurese PRN    #HEME  -No active concerns  -On DAPT    #ENDO  -No active issues  -Check A1c  -Check TSH    #ID  -Slight leukocytosis, likely reactive, afebrile  -Continue to monitor WBC and temperature curve    #GOC  Full Code CICU Transfer Note  ---------------------------    [facility]  Transfer to:  (  ) Medicine    (x) Telemetry    (  ) RCU    (  ) Palliative    (  ) Stroke Unit    (  ) _______________  Accepting Physician: Ellyn Meyer MD      [facility] COURSE:  Patient is a 60y old  Male who presents with a chief complaint of IWSTEMI. 59y/o M w/ h/o HTN, off medication x 1year, ?angioedema on daily Allegra BIBEMS for STEMI. Pt states that he had chest pain L side starting at 12pm, went to urgent care and showed inferior wall STEMI. Was given 325mg ASA and 1 SL nitro. No sob, syncope, n/v, palpitations. Reports 1 prior episode 2 weeks prior which resolved with rest. Here underwent PCI to dCx for 100% occlusion. Given 40mg IV Lasix during cath for elevated LVEDP of 33. Loaded with Brilinta. Other [facility] cath findings include % LAD lesion and 10% mRCA. Reports feeling much improved with only residual chest discomfort. On arrival to CCU patient went into wide complex tachycardia with rates in low 100s. /96. Denies chest pain, palpitations. 10/17: IWSTEMI, ANÍBAL x1 dCx 100%.      To-Do:  [ ] repeat CBC, CMP   [ ] Repeat Troponin, CKMB      OBJECTIVE:  Vital Signs Last 24 Hrs  T(C): 36.6 (18 Oct 2021 05:00), Max: 36.8 (17 Oct 2021 17:45)  T(F): 97.8 (18 Oct 2021 05:00), Max: 98.2 (17 Oct 2021 17:45)  HR: 72 (18 Oct 2021 11:00) (57 - 102)  BP: 167/96 (18 Oct 2021 11:00) (119/82 - 168/100)  BP(mean): 126 (18 Oct 2021 11:00) (96 - 126)  RR: 18 (18 Oct 2021 11:00) (10 - 25)  SpO2: 95% (18 Oct 2021 11:00) (93% - 100%)      I&O's Summary:    17 Oct 2021 07:01  -  18 Oct 2021 07:00  --------------------------------------------------------  IN: 339.8 mL / OUT: 2200 mL / NET: -1860.2 mL    18 Oct 2021 07:01  -  18 Oct 2021 13:16  --------------------------------------------------------  IN: 283.8 mL / OUT: 0 mL / NET: 283.8 mL      MEDICATIONS  (STANDING):  aspirin  chewable 81 milliGRAM(s) Oral daily  atorvastatin 80 milliGRAM(s) Oral at bedtime  chlorhexidine 4% Liquid 1 Application(s) Topical <User Schedule>  influenza   Vaccine 0.5 milliLiter(s) IntraMuscular once  loratadine 10 milliGRAM(s) Oral daily  losartan 25 milliGRAM(s) Oral daily  metoprolol tartrate 25 milliGRAM(s) Oral two times a day  ticagrelor 90 milliGRAM(s) Oral every 12 hours      LABS:                                            15.3                  Neurophils% (auto):   x      (10-18 @ 01:03):    12.62)-----------(209          Lymphocytes% (auto):  x                                             45.5                   Eosinphils% (auto):   x        Manual%: Neutrophils x    ; Lymphocytes x    ; Eosinophils x    ; Bands%: x    ; Blasts x                                    136    |  97     |  15                  Calcium: 9.4   / iCa: x      (10-18 @ 01:03)    ----------------------------<  143       Magnesium: 2.5                              4.0     |  21     |  0.74             Phosphorous: 3.5      TPro  7.1    /  Alb  4.9    /  TBili  0.6    /  DBili  x      /  AST  244    /  ALT  61     /  AlkPhos  79     18 Oct 2021 01:03    ( 10-17 @ 15:23 )   PT: 12.3 sec;   INR: 1.03 ratio  aPTT: 28.9 sec      ASSESSMENT & PLAN:     59yo M with history of HTN, not taking medications, and ?angioedema presented to hospital with CP and ST elevations in II, III and aVF consistent with IWSTEMI. S/p PCI to dCx 100% occlusion.    #NEURO  - No active issues.  - A&Ox4    #CVS  IWSTEMI, S/p PCI to 100% dCx occlusion via R. radial access.  -Continue with DAPT: ASA and Brilinta  -High intensity statin  -TTE performed earlier this AM (10/18)  -Trend cardiac enzymes to peak  -GDMT    Hypertension  -Started losartan 25mg daily    #RESP  -No active issues  -Saturating well on RA    #GI  -DASH/TLC diet  -Last BM 10/18    #RENAL  -Cr has been stable, most recent 0.74  -Continue to trend I/Os, serum Cr  -Check electrolytes given VT on arrival to unit  -Diurese PRN    #HEME  -No active concerns  -On DAPT    #ENDO  -No active issues  -Check A1c  -Check TSH    #ID  -Slight leukocytosis, likely reactive, afebrile  -Continue to monitor WBC and temperature curve    #GOC  Full Code

## 2021-10-18 NOTE — PROGRESS NOTE ADULT - ATTENDING COMMENTS
Inferior wall STEMI, now status post PCI.  Troponin and CK/CK-MB elevated. Trend enzymes to peak.  Monitor renal function.    Continue dual antiplatelet therapy with ASA and ticagrelor.  High intensity statin.  Beta-blocker and ARB - uptitrate as tolerated.     Follow-up TTE.

## 2021-10-19 ENCOUNTER — TRANSCRIPTION ENCOUNTER (OUTPATIENT)
Age: 61
End: 2021-10-19

## 2021-10-19 VITALS
SYSTOLIC BLOOD PRESSURE: 119 MMHG | TEMPERATURE: 98 F | OXYGEN SATURATION: 98 % | DIASTOLIC BLOOD PRESSURE: 76 MMHG | RESPIRATION RATE: 18 BRPM | HEART RATE: 86 BPM

## 2021-10-19 DIAGNOSIS — I21.3 ST ELEVATION (STEMI) MYOCARDIAL INFARCTION OF UNSPECIFIED SITE: ICD-10-CM

## 2021-10-19 DIAGNOSIS — I10 ESSENTIAL (PRIMARY) HYPERTENSION: ICD-10-CM

## 2021-10-19 PROBLEM — Z87.898 PERSONAL HISTORY OF OTHER SPECIFIED CONDITIONS: Chronic | Status: ACTIVE | Noted: 2021-10-17

## 2021-10-19 PROCEDURE — 85610 PROTHROMBIN TIME: CPT

## 2021-10-19 PROCEDURE — 36415 COLL VENOUS BLD VENIPUNCTURE: CPT

## 2021-10-19 PROCEDURE — 80061 LIPID PANEL: CPT

## 2021-10-19 PROCEDURE — 85025 COMPLETE CBC W/AUTO DIFF WBC: CPT

## 2021-10-19 PROCEDURE — C8929: CPT

## 2021-10-19 PROCEDURE — 86803 HEPATITIS C AB TEST: CPT

## 2021-10-19 PROCEDURE — 99291 CRITICAL CARE FIRST HOUR: CPT | Mod: 25

## 2021-10-19 PROCEDURE — 83036 HEMOGLOBIN GLYCOSYLATED A1C: CPT

## 2021-10-19 PROCEDURE — U0003: CPT

## 2021-10-19 PROCEDURE — 80053 COMPREHEN METABOLIC PANEL: CPT

## 2021-10-19 PROCEDURE — C1887: CPT

## 2021-10-19 PROCEDURE — C1725: CPT

## 2021-10-19 PROCEDURE — 83735 ASSAY OF MAGNESIUM: CPT

## 2021-10-19 PROCEDURE — 85730 THROMBOPLASTIN TIME PARTIAL: CPT

## 2021-10-19 PROCEDURE — 99239 HOSP IP/OBS DSCHRG MGMT >30: CPT

## 2021-10-19 PROCEDURE — 71045 X-RAY EXAM CHEST 1 VIEW: CPT

## 2021-10-19 PROCEDURE — 84100 ASSAY OF PHOSPHORUS: CPT

## 2021-10-19 PROCEDURE — 93005 ELECTROCARDIOGRAM TRACING: CPT

## 2021-10-19 PROCEDURE — 82550 ASSAY OF CK (CPK): CPT

## 2021-10-19 PROCEDURE — 93458 L HRT ARTERY/VENTRICLE ANGIO: CPT | Mod: 59

## 2021-10-19 PROCEDURE — 99152 MOD SED SAME PHYS/QHP 5/>YRS: CPT

## 2021-10-19 PROCEDURE — C1894: CPT

## 2021-10-19 PROCEDURE — C1769: CPT

## 2021-10-19 PROCEDURE — 83880 ASSAY OF NATRIURETIC PEPTIDE: CPT

## 2021-10-19 PROCEDURE — C9606: CPT | Mod: LC

## 2021-10-19 PROCEDURE — 84443 ASSAY THYROID STIM HORMONE: CPT

## 2021-10-19 PROCEDURE — 85027 COMPLETE CBC AUTOMATED: CPT

## 2021-10-19 PROCEDURE — 96374 THER/PROPH/DIAG INJ IV PUSH: CPT

## 2021-10-19 PROCEDURE — 99153 MOD SED SAME PHYS/QHP EA: CPT

## 2021-10-19 PROCEDURE — 82553 CREATINE MB FRACTION: CPT

## 2021-10-19 PROCEDURE — 84484 ASSAY OF TROPONIN QUANT: CPT

## 2021-10-19 PROCEDURE — C1874: CPT

## 2021-10-19 RX ORDER — LOSARTAN POTASSIUM 100 MG/1
1 TABLET, FILM COATED ORAL
Qty: 30 | Refills: 3
Start: 2021-10-19 | End: 2022-02-15

## 2021-10-19 RX ORDER — ATORVASTATIN CALCIUM 80 MG/1
1 TABLET, FILM COATED ORAL
Qty: 30 | Refills: 3
Start: 2021-10-19 | End: 2022-02-15

## 2021-10-19 RX ORDER — LOSARTAN POTASSIUM 100 MG/1
1 TABLET, FILM COATED ORAL
Qty: 0 | Refills: 0 | DISCHARGE

## 2021-10-19 RX ORDER — METOPROLOL TARTRATE 50 MG
1 TABLET ORAL
Qty: 30 | Refills: 3
Start: 2021-10-19 | End: 2022-02-15

## 2021-10-19 RX ORDER — TICAGRELOR 90 MG/1
1 TABLET ORAL
Qty: 180 | Refills: 3
Start: 2021-10-19 | End: 2022-10-13

## 2021-10-19 RX ORDER — ASPIRIN/CALCIUM CARB/MAGNESIUM 324 MG
1 TABLET ORAL
Qty: 30 | Refills: 3
Start: 2021-10-19 | End: 2022-02-15

## 2021-10-19 RX ADMIN — HEPARIN SODIUM 5000 UNIT(S): 5000 INJECTION INTRAVENOUS; SUBCUTANEOUS at 05:21

## 2021-10-19 RX ADMIN — LOSARTAN POTASSIUM 25 MILLIGRAM(S): 100 TABLET, FILM COATED ORAL at 05:20

## 2021-10-19 RX ADMIN — Medication 81 MILLIGRAM(S): at 11:58

## 2021-10-19 RX ADMIN — Medication 25 MILLIGRAM(S): at 05:21

## 2021-10-19 RX ADMIN — LORATADINE 10 MILLIGRAM(S): 10 TABLET ORAL at 11:58

## 2021-10-19 RX ADMIN — TICAGRELOR 90 MILLIGRAM(S): 90 TABLET ORAL at 05:20

## 2021-10-19 NOTE — DISCHARGE NOTE PROVIDER - NSDCFUADDAPPT_GEN_ALL_CORE_FT
Please follow up with  PMD  outpatient in 1-2 weeks   Please follow up with cardiology in 1-2 weeks

## 2021-10-19 NOTE — DISCHARGE NOTE PROVIDER - CARE PROVIDER_API CALL
Farhad Diego)  Cardiology; Internal Medicine; Interventional Cardiology  [address], [address]  [place], [place] [address]  Phone: [phone]  Fax: [phone]  Follow Up Time:    Farhad Diego)  Cardiology; Internal Medicine; Interventional Cardiology  [address], [address]  [place], [place] [id number]  Phone: [phone]  Fax: [phone]  Follow Up Time: 1 week

## 2021-10-19 NOTE — PROGRESS NOTE ADULT - PROBLEM SELECTOR PLAN 1
Echo 10/18 w/ no LV thrombus, Mild segmental left ventricular systolic dysfunction, mid inferolateral wall, and the basal  inferolateral wall are hypokinetic.  No telemetry events, patient feels well  c/w DAPT, ARB, BB  convert BB to toprol on d/c   d/w Dr. Pennington, stable for d/c home today with outpatient cardiology f/u Echo 10/18 w/ no LV thrombus, Mild segmental left ventricular systolic dysfunction, mid inferolateral wall, and the basal  inferolateral wall are hypokinetic.  No telemetry events, patient feels well  c/w DAPT, ARB, BB, statin  convert BB to toprol on d/c   d/w Dr. Pennington, stable for d/c home today with outpatient cardiology f/u Echo 10/18 w/ no LV thrombus, Mild segmental left ventricular systolic dysfunction, mid inferolateral wall, and the basal  inferolateral wall are hypokinetic.  No telemetry events, patient feels well  c/w DAPT, ARB, BB, statin. I confirmed [facility]/ [facility] that patient can obtain 30 day supply free and they will verify further refills   convert BB to toprol on d/c   d/w Dr. Pennington, stable for d/c home today with outpatient cardiology f/u Echo 10/18 w/ no LV thrombus, Mild segmental left ventricular systolic dysfunction, mid inferolateral wall, and the basal  inferolateral wall are hypokinetic.  No telemetry events, patient feels well  c/w DAPT, ARB, BB, statin. I confirmed w/ [facility] that patient can obtain 30 day supply free and they will verify further refills   Discussed w/ patient not to stop DAPT for any reason unless told by cardiologist. He is aware to return to the hospital for any bleeding.   convert BB to toprol on d/c   d/w Dr. Pennington, stable for d/c home today with outpatient cardiology f/u

## 2021-10-19 NOTE — DISCHARGE NOTE NURSING/CASE MANAGEMENT/SOCIAL WORK - PATIENT PORTAL LINK FT
You can access the FollowMyHealth Patient Portal offered by [facility] by registering at the following website: [email or web address]. By joining [employer]’s FollowMyHealth portal, you will also be able to view your health information using other applications (apps) compatible with our system.

## 2021-10-19 NOTE — DISCHARGE NOTE PROVIDER - NSDCMRMEDTOKEN_GEN_ALL_CORE_FT
aspirin 81 mg oral tablet, chewable: 1 tab(s) orally once a day  atorvastatin 80 mg oral tablet: 1 tab(s) orally once a day (at bedtime)  Brilinta (ticagrelor) 90 mg oral tablet: 1 tab(s) orally 2 times a day   Claritin 10 mg oral tablet: 1 tab(s) orally once a day  losartan 25 mg oral tablet: 1 tab(s) orally once a day  Metoprolol Succinate ER 50 mg oral tablet, extended release: 1 tab(s) orally once a day

## 2021-10-19 NOTE — PROGRESS NOTE ADULT - ASSESSMENT
59yo M with history of HTN, not taking medications, and ?angioedema presented to hospital with CP and ST elevations in II, III and aVF consistent with IWSTEMI. S/p PCI to dCx 100% occlusion.    #NEURO  - No active issues.  - A&Ox4    #CVS  IWSTEMI, S/p PCI to 100% dCx occlusion via R. radial access.  -Continue with DAPT: ASA and Brilinta  -High intensity statin  -TTE performed earlier this AM (10/18)  -Trend cardiac enzymes to peak  -GDMT    Hypertension  -Started losartan 25mg daily    #RESP  -No active issues  -Saturating well on RA    #GI  -DASH/TLC diet  -Last BM 10/18    #RENAL  -Cr has been stable, most recent 0.74  -Continue to trend I/Os, serum Cr  -Check electrolytes given VT on arrival to unit  -Diurese PRN    #HEME  -No active concerns  -On DAPT    #ENDO  -No active issues  -Check A1c  -Check TSH    #ID  -Slight leukocytosis, likely reactive, afebrile  -Continue to monitor WBC and temperature curve    #GOC  Full Code
61y/o M w/ h/o HTN, off losartan x 1year, ?angioedema on daily allegra BIBEMS for STEMI now s/p PCI to dCx for 100% occlusion.       Other [facility] cath findings include 20-30% LAD lesion and 10% mRCA. CCU course c/b wide complex tachycardia which resolved.

## 2021-10-19 NOTE — DISCHARGE NOTE PROVIDER - HOSPITAL COURSE
61y/o M w/ h/o HTN, off losartan x 1year, ?angioedema on daily allegra BIBEMS for STEMI after presenting to urgent care with L sided CP. Here underwent PCI to dCx for 100% occlusion. Other [facility] cath findings include 20-30% LAD lesion and 10% mRCA. CCU course c/b wide complex tachycardia which resolved. Echo w/ no LV thrombus, Mildsegmental left ventricular systolic dysfunction, mid inferolateral wall, and the basal  inferolateral wall are hypokinetic.  Stable for discharge with outpatient cardiologist and PCP follow up.    61y/o M w/ h/o HTN, off losartan x 1year, ?angioedema on daily allegra BIBEMS for STEMI after presenting to urgent care with L sided CP. Here underwent PCI to dCx for 100% occlusion. Other [facility] cath findings include 20-30% LAD lesion and 10% mRCA. CCU course c/b wide complex tachycardia which resolved. Echo w/ no LV thrombus, Mildsegmental left ventricular systolic dysfunction, mid inferolateral wall, and the basal  inferolateral wall are hypokinetic.Echo 10/18 w/ no LV thrombus, Mild segmental left ventricular systolic dysfunction, mid inferolateral wall, and the basal  inferolateral wall are hypokinetic.  No telemetry events, patient feels well    Stable for discharge with outpatient cardiologist and PCP follow up.

## 2021-10-19 NOTE — PROGRESS NOTE ADULT - NSPROGADDITIONALINFOA_GEN_ALL_CORE
d/w Dr. Pennington  Time spent on d/c 45 minutes d/w Dr. Pennington  Time spent on d/c 45 minutes    d/w BARREI Wise

## 2021-10-19 NOTE — PROGRESS NOTE ADULT - SUBJECTIVE AND OBJECTIVE BOX
[facility] Division of Hospital Medicine  Abiola Brush DO pager [phone]    Patient is a 60y old  Male who presents with a chief complaint of IWSTEMI (18 Oct 2021 16:55)      SUBJECTIVE / OVERNIGHT EVENTS:  Seen on 3 DSU, no CP, SOB, dizziness, palpitations, bleeding.   Telemetry reviewed: sinus, no events.     HOSPITAL COURSE:  59y/o M w/ h/o HTN, off losartan x 1year, ?angioedema on daily allegra BIBEMS for STEMI after presenting to urgent care with L sided CP. Here underwent PCI to dCx for 100% occlusion. Other [facility] cath findings include 20-30% LAD lesion and 10% mRCA. CCU course c/b wide complex tachycardia which resolved. Echo w/ no LV thrombus, Mild  segmental left ventricular systolic dysfunction, mid inferolateral wall, and the basal  inferolateral wall are hypokinetic.    PMH: HTN  NKDA   No major surgeries  Family hx MI in father in his 60s  Social: Never smoker, endorses 2 beers/week. Lives with wife, independent w/ ADLS.     MEDICATIONS  (STANDING):  aspirin  chewable 81 milliGRAM(s) Oral daily  atorvastatin 80 milliGRAM(s) Oral at bedtime  heparin   Injectable 5000 Unit(s) SubCutaneous every 8 hours  influenza   Vaccine 0.5 milliLiter(s) IntraMuscular once  loratadine 10 milliGRAM(s) Oral daily  losartan 25 milliGRAM(s) Oral daily  metoprolol tartrate 25 milliGRAM(s) Oral two times a day  ticagrelor 90 milliGRAM(s) Oral every 12 hours    MEDICATIONS  (PRN):      CAPILLARY BLOOD GLUCOSE        I&O's Summary    18 Oct 2021 07:01  -  19 Oct 2021 07:00  --------------------------------------------------------  IN: 778.4 mL / OUT: 550 mL / NET: 228.4 mL        PHYSICAL EXAM:  Vital Signs Last 24 Hrs  T(C): 36.8 (19 Oct 2021 05:23), Max: 37 (18 Oct 2021 19:00)  T(F): 98.2 (19 Oct 2021 05:23), Max: 98.6 (18 Oct 2021 19:00)  HR: 77 (19 Oct 2021 05:23) (66 - 79)  BP: 136/82 (19 Oct 2021 05:23) (129/77 - 167/96)  BP(mean): 98 (18 Oct 2021 19:00) (98 - 126)  RR: 18 (19 Oct 2021 05:23) (10 - 25)  SpO2: 94% (19 Oct 2021 05:23) (94% - 98%)    CONSTITUTIONAL: NAD, well-developed, well-groomed  EYES: PERRL; conjunctiva and sclera clear  ENMT: Moist oral mucosa, no pharyngeal injection or exudates  NECK: Supple, no palpable masses; no thyromegaly  RESPIRATORY: Normal respiratory effort; lungs are clear to auscultation bilaterally  CARDIOVASCULAR: Regular rate and rhythm, normal S1 and S2, no murmur/rub/gallop; No lower extremity edema  ABDOMEN: Nontender to palpation, normoactive bowel sounds, no rebound/guarding  MUSCULOSKELETAL:  no clubbing or cyanosis of digits; no joint swelling or tenderness to palpation  PSYCH: A+O to person, place, and time; affect appropriate  NEUROLOGY: CN 2-12 are intact and symmetric; no focal deficits   SKIN: No rashes; no palpable lesions    LABS:                        15.3   12.62 )-----------( 209      ( 18 Oct 2021 01:03 )             45.5     10-18    133<L>  |  96  |  20  ----------------------------<  117<H>  3.7   |  23  |  1.03    Ca    9.8      18 Oct 2021 13:40  Phos  3.5     10-18  Mg     2.5     10-18    TPro  7.5  /  Alb  4.9  /  TBili  1.1  /  DBili  x   /  AST  220<H>  /  ALT  67<H>  /  AlkPhos  87  10-18    PT/INR - ( 17 Oct 2021 15:23 )   PT: 12.3 sec;   INR: 1.03 ratio         PTT - ( 17 Oct 2021 15:23 )  PTT:28.9 sec  CARDIAC MARKERS ( 18 Oct 2021 13:40 )  x     / x     / x     / x     / 182.5 ng/mL  CARDIAC MARKERS ( 18 Oct 2021 01:03 )  x     / x     / x     / x     / 349.0 ng/mL  CARDIAC MARKERS ( 17 Oct 2021 18:51 )  x     / x     / x     / x     / 290.4 ng/mL  CARDIAC MARKERS ( 17 Oct 2021 15:23 )  x     / x     / 90 U/L / x     / 3.7 ng/mL            RADIOLOGY & ADDITIONAL TESTS:  Results Reviewed:   Imaging Personally Reviewed:  Electrocardiogram Personally Reviewed:    COORDINATION OF CARE:  Care Discussed with Consultants/Other Providers [Y/N]:  Prior or Outpatient Records Reviewed [Y/N]:

## 2021-10-19 NOTE — DISCHARGE NOTE PROVIDER - NSDCCPCAREPLAN_GEN_ALL_CORE_FT
PRINCIPAL DISCHARGE DIAGNOSIS  Diagnosis: ST elevation myocardial infarction (STEMI)  Assessment and Plan of Treatment: Low salt, low fat diet.   Weight management.   Take medications as prescribed.    No smoking.  Follow up appointments with your doctor(s)  as instruced.        SECONDARY DISCHARGE DIAGNOSES  Diagnosis: HTN (hypertension)  Assessment and Plan of Treatment: Low salt diet  Activity as tolerated.  Take all medication as prescribed.  Follow up with your medical doctor for routine blood pressure monitoring at your next visit.  Notify your doctor if you have any of the following symptoms:   Dizziness, Lightheadedness, Blurry vision, Headache, Chest pain, Shortness of breath       PRINCIPAL DISCHARGE DIAGNOSIS  Diagnosis: ST elevation myocardial infarction (STEMI)  Assessment and Plan of Treatment: Low salt, low fat diet.   Weight management.   Take medications as prescribed.    No smoking.  Follow up appointments with your doctor(s)  as instruced.  Brillanta and Aspirin continue do not  stop unless told by cardiologist         SECONDARY DISCHARGE DIAGNOSES  Diagnosis: HTN (hypertension)  Assessment and Plan of Treatment: Low salt diet  Activity as tolerated.  Take all medication as prescribed.  Follow up with your medical doctor for routine blood pressure monitoring at your next visit.  Notify your doctor if you have any of the following symptoms:   Dizziness, Lightheadedness, Blurry vision, Headache, Chest pain, Shortness of breath

## 2021-11-03 ENCOUNTER — APPOINTMENT (OUTPATIENT)
Dept: CARDIOLOGY | Facility: CLINIC | Age: 61
End: 2021-11-03
Payer: OTHER GOVERNMENT

## 2021-11-03 ENCOUNTER — NON-APPOINTMENT (OUTPATIENT)
Age: 61
End: 2021-11-03

## 2021-11-03 VITALS
HEART RATE: 66 BPM | HEIGHT: 70 IN | BODY MASS INDEX: 26.92 KG/M2 | WEIGHT: 188 LBS | OXYGEN SATURATION: 100 % | SYSTOLIC BLOOD PRESSURE: 130 MMHG | DIASTOLIC BLOOD PRESSURE: 80 MMHG

## 2021-11-03 PROCEDURE — 93000 ELECTROCARDIOGRAM COMPLETE: CPT

## 2021-11-03 PROCEDURE — 99244 OFF/OP CNSLTJ NEW/EST MOD 40: CPT

## 2021-11-09 ENCOUNTER — APPOINTMENT (OUTPATIENT)
Dept: FAMILY MEDICINE | Facility: CLINIC | Age: 61
End: 2021-11-09
Payer: OTHER GOVERNMENT

## 2021-11-09 VITALS — TEMPERATURE: 96.9 F | DIASTOLIC BLOOD PRESSURE: 70 MMHG | SYSTOLIC BLOOD PRESSURE: 116 MMHG

## 2021-11-09 VITALS — BODY MASS INDEX: 26.69 KG/M2 | WEIGHT: 186 LBS

## 2021-11-09 DIAGNOSIS — Z23 ENCOUNTER FOR IMMUNIZATION: ICD-10-CM

## 2021-11-09 PROCEDURE — 99213 OFFICE O/P EST LOW 20 MIN: CPT | Mod: 25

## 2021-11-09 PROCEDURE — G0008: CPT

## 2021-11-09 PROCEDURE — 90686 IIV4 VACC NO PRSV 0.5 ML IM: CPT

## 2021-11-09 RX ORDER — LOSARTAN POTASSIUM 25 MG/1
25 TABLET, FILM COATED ORAL
Qty: 90 | Refills: 1 | Status: COMPLETED | COMMUNITY
End: 2021-11-09

## 2021-11-09 RX ORDER — INDOMETHACIN 50 MG/1
50 CAPSULE ORAL 3 TIMES DAILY
Qty: 30 | Refills: 0 | Status: COMPLETED | COMMUNITY
Start: 2019-09-24 | End: 2021-11-09

## 2021-11-09 NOTE — PHYSICAL EXAM
[Well Developed] : well developed [Normal Conjunctiva] : normal conjunctiva [Normal Venous Pressure] : normal venous pressure [No Murmur] : no murmur [Clear Lung Fields] : clear lung fields [Good Air Entry] : good air entry [Soft] : abdomen soft [Non Tender] : non-tender [No Edema] : no edema [No Cyanosis] : no cyanosis [No Rash] : no rash [No Skin Lesions] : no skin lesions [No Focal Deficits] : no focal deficits [Alert and Oriented] : alert and oriented

## 2021-11-09 NOTE — HISTORY OF PRESENT ILLNESS
[de-identified] : 60 y/o M PMhx CAD s/p 3 weeks STEMI s/p PCI (Losartan, ASA 81, Atorvastatin, Brilinta ,Metoprolol).Offers no complaints. Feels well. No CP or SOB.  Started walking daily.

## 2021-11-09 NOTE — REASON FOR VISIT
[Hyperlipidemia] : hyperlipidemia [Hypertension] : hypertension [Coronary Artery Disease] : coronary artery disease [FreeTextEntry1] : Filemon Munroe is a 60 year old man with h/o HTN (off losartan), CAD s/p STEMI s/p dCx intervention who presents today for first time after recent hospitalization. Patient presented 10/17/2021 with STEMI after presenting to urgent care with L sided CP. He underwent PCI to dCx for 100% occlusion. Other [facility] cath findings include 20-30% LAD lesion and 10% mRCA. CCU course c/b wide complex tachycardia which resolved. Echo w/ no LV thrombus, Mild segmental left ventricular systolic dysfunction, mid inferolateral wall, and the basal  inferolateral wall are hypokinetic.Echo 10/18 w/ no LV thrombus, Mild segmental left ventricular systolic dysfunction, mid inferolateral wall, and the basal  inferolateral wall are hypokinetic.\par No telemetry events, patient feels well.\par \par Patient feels well. No chest pain, sob. Has started going on walks recently for 30 minutes or so. Has not had any limitations. \par \par Discharge Medications	aspirin 81 mg oral tablet, chewable: 1 tab(s) orally once a day\par atorvastatin 80 mg oral tablet: 1 tab(s) orally once a day (at bedtime)\par Brilinta (ticagrelor) 90 mg oral tablet: 1 tab(s) orally 2 times a day \par Claritin 10 mg oral tablet: 1 tab(s) orally once a day\par losartan 25 mg oral tablet: 1 tab(s) orally once a day\par Metoprolol Succinate ER 50 mg oral tablet, extended release: 1 tab(s) orally once a day\par

## 2021-11-09 NOTE — HISTORY OF PRESENT ILLNESS
[FreeTextEntry1] : [facility] 10/2021:\par Conclusions: \par Culprit 100% mid Cx for cause of posterior inferior STEMI. LVEDP\par elevated.Successul PCI to Cx into OM2 with MANNY 3 flow\par resolution of chest pain and ST elevations.Maintain DAPT. Recommend\par aggresssive medical management of CAD as per\par ACC/AHA guidelines. Patient to be monitored closely in the CICU.  \par 2.5 x 26 resolute kareem placed, 12 ESTELA \par \par 10/18/2021 TTE:\par Conclusions:\par 1. Mitral annular calcification, otherwise normal mitral\par valve. Mild mitral regurgitation.\par 2. Calcified trileaflet aortic valve with normal opening.\par Mild aortic regurgitation.\par 3. Endocardial visualization enhanced with intravenous\par injection of Ultrasonic Enhancing Agent (Definity). Mild\par segmental left ventricular systolic dysfunction. No left\par ventricular thrombus. The mid inferolateral wall, and the\par basal  inferolateral wall are hypokinetic.\par 4. Normal right ventricular size and function.\par *** No previous Echo exam.

## 2021-11-09 NOTE — DISCUSSION/SUMMARY
[Coronary Artery Disease] : coronary artery disease [Medication Changes Per Orders] : Medication changes are as documented in orders [Patient] : the patient [Risks] : risks [Benefits] : benefits [Alternatives] : alternatives [Minutes Spent: ___] : for [unfilled] ~Uminutes [___ Week(s)] : in [unfilled] week(s) [FreeTextEntry3] : 3 Weeks [FreeTextEntry2] : 30 [FreeTextEntry1] :  60 year old man with h/o HTN (off losartan), CAD s/p STEMI s/p dCx intervention here in the office. \par \par CAD s/p STEMI Revascularization of dCx\par Doing well. EF preserved with structurally abnormal segments. \par -c/w aspirin 81 mg daily\par -c/w ticagrelor 90 mg BID\par -c/w atorvastatin 80 mg daily\par -increased losartan to 50 mg daily for neurohormal control. \par -c/w metoprolol 50 mg daily \par -follow up TTE in 3 months \par -had lengthy conversation with patient regarding recent hospitalization, treatment plan going forward\par \par Follow up in office in 1 month. \par

## 2021-12-01 ENCOUNTER — APPOINTMENT (OUTPATIENT)
Dept: CARDIOLOGY | Facility: CLINIC | Age: 61
End: 2021-12-01
Payer: OTHER GOVERNMENT

## 2021-12-01 VITALS
HEART RATE: 63 BPM | HEIGHT: 70 IN | DIASTOLIC BLOOD PRESSURE: 80 MMHG | OXYGEN SATURATION: 97 % | WEIGHT: 188 LBS | BODY MASS INDEX: 26.92 KG/M2 | RESPIRATION RATE: 17 BRPM | SYSTOLIC BLOOD PRESSURE: 122 MMHG

## 2021-12-01 PROCEDURE — 99214 OFFICE O/P EST MOD 30 MIN: CPT

## 2021-12-01 PROCEDURE — 93000 ELECTROCARDIOGRAM COMPLETE: CPT

## 2021-12-06 NOTE — REASON FOR VISIT
[FreeTextEntry1] : Filemon Munroe is a 60 year old man with h/o HTN (off losartan), CAD s/p STEMI s/p dCx intervention who presents today for follow up. Patient presented 10/17/2021 with STEMI after presenting to urgent care with L sided CP. He underwent PCI to dCx for 100% occlusion. Other [facility] cath findings include 20-30% LAD lesion and 10% mRCA. CCU course c/b wide complex tachycardia which resolved. Echo w/ no LV thrombus, Mild segmental left ventricular systolic dysfunction, mid inferolateral wall, and the basal inferolateral wall are hypokinetic.Echo 10/18 w/ no LV thrombus, Mild segmental left ventricular systolic dysfunction, mid inferolateral wall, and the basal inferolateral wall are hypokinetic.\par No telemetry events, patient feels well.\par \par Patient feels well. No chest pain, sob. Has started going on walks recently for 30 minutes or so. Has not had any limitations. Continues to do so with his wife on exercise regimen. \par \par Discharge Medications	aspirin 81 mg oral tablet, chewable: 1 tab(s) orally once a day\par atorvastatin 80 mg oral tablet: 1 tab(s) orally once a day (at bedtime)\par Brilinta (ticagrelor) 90 mg oral tablet: 1 tab(s) orally 2 times a day \par Claritin 10 mg oral tablet: 1 tab(s) orally once a day\par losartan 25 mg oral tablet: 1 tab(s) orally once a day--> increased to 50 mg daily \par Metoprolol Succinate ER 50 mg oral tablet, extended release: 1 tab(s) orally once a day\par

## 2021-12-06 NOTE — HISTORY OF PRESENT ILLNESS
[FreeTextEntry1] : [facility] 10/2021:\par Conclusions: \par Culprit 100% mid Cx for cause of posterior inferior STEMI. LVEDP\par elevated.Successul PCI to Cx into OM2 with MANNY 3 flow\par resolution of chest pain and ST elevations.Maintain DAPT. Recommend\par aggresssive medical management of CAD as per\par ACC/AHA guidelines. Patient to be monitored closely in the CICU. \par 2.5 x 26 resolute kareem placed, 12 ESTELA \par \par 10/18/2021 TTE:\par Conclusions:\par 1. Mitral annular calcification, otherwise normal mitral\par valve. Mild mitral regurgitation.\par 2. Calcified trileaflet aortic valve with normal opening.\par Mild aortic regurgitation.\par 3. Endocardial visualization enhanced with intravenous\par injection of Ultrasonic Enhancing Agent (Definity). Mild\par segmental left ventricular systolic dysfunction. No left\par ventricular thrombus. The mid inferolateral wall, and the\par basal inferolateral wall are hypokinetic.\par 4. Normal right ventricular size and function.\par *** No previous Echo exam. \par

## 2021-12-06 NOTE — DISCUSSION/SUMMARY
[FreeTextEntry1] : 60 year old man with h/o HTN (off losartan), CAD s/p STEMI s/p dCx intervention here in the office. \par \par CAD s/p STEMI Revascularization of dCx\par Doing well. EF preserved with structurally abnormal segments. \par -c/w aspirin 81 mg daily\par -c/w ticagrelor 90 mg BID\par -c/w atorvastatin 80 mg daily\par -c/w losartan to 50 mg daily for neurohormal control. \par -c/w metoprolol 50 mg daily \par -follow up TTE in 3 months (1/18/2021)\par -had lengthy conversation with patient regarding recent hospitalization, treatment plan going forward\par \par Follow up in office in 3 month. \par

## 2022-03-08 ENCOUNTER — RX RENEWAL (OUTPATIENT)
Age: 62
End: 2022-03-08

## 2022-03-09 ENCOUNTER — APPOINTMENT (OUTPATIENT)
Dept: CARDIOLOGY | Facility: CLINIC | Age: 62
End: 2022-03-09
Payer: OTHER GOVERNMENT

## 2022-03-09 ENCOUNTER — NON-APPOINTMENT (OUTPATIENT)
Age: 62
End: 2022-03-09

## 2022-03-09 VITALS
SYSTOLIC BLOOD PRESSURE: 102 MMHG | HEART RATE: 67 BPM | DIASTOLIC BLOOD PRESSURE: 70 MMHG | WEIGHT: 184 LBS | OXYGEN SATURATION: 97 % | BODY MASS INDEX: 26.4 KG/M2

## 2022-03-09 PROCEDURE — 99215 OFFICE O/P EST HI 40 MIN: CPT

## 2022-03-09 PROCEDURE — 93306 TTE W/DOPPLER COMPLETE: CPT

## 2022-03-09 PROCEDURE — 93000 ELECTROCARDIOGRAM COMPLETE: CPT

## 2022-03-09 NOTE — HISTORY OF PRESENT ILLNESS
[FreeTextEntry1] : [facility] 10/2021:\par Conclusions: \par Culprit 100% mid Cx for cause of posterior inferior STEMI. LVEDP\par elevated.Successul PCI to Cx into OM2 with MANNY 3 flow\par resolution of chest pain and ST elevations.Maintain DAPT. Recommend\par aggresssive medical management of CAD as per\par ACC/AHA guidelines. Patient to be monitored closely in the CICU.  \par 2.5 x 26 resolute kareem placed, 12 ESTELA \par \par 10/18/2021 TTE:\par Conclusions:\par 1. Mitral annular calcification, otherwise normal mitral\par valve. Mild mitral regurgitation.\par 2. Calcified trileaflet aortic valve with normal opening.\par Mild aortic regurgitation.\par 3. Endocardial visualization enhanced with intravenous\par injection of Ultrasonic Enhancing Agent (Definity). Mild\par segmental left ventricular systolic dysfunction. No left\par ventricular thrombus. The mid inferolateral wall, and the\par basal  inferolateral wall are hypokinetic.\par 4. Normal right ventricular size and function.\par *** No previous Echo exam.

## 2022-03-09 NOTE — REASON FOR VISIT
[Symptom and Test Evaluation] : symptom and test evaluation [FreeTextEntry1] : Filemon Munroe is a 60 year old man with h/o HTN (off losartan), CAD s/p STEMI s/p dCx intervention who presents today for follow up. Patient presented 10/17/2021 with STEMI with PCI to dCx for 100% occlusion. Other [facility] cath findings include 20-30% LAD lesion and 10% mRCA. CCU course c/b wide complex tachycardia which resolved. Echo w/ no LV thrombus, Mild segmental left ventricular systolic dysfunction, mid inferolateral wall, and the basal inferolateral wall are hypokinetic.Echo 10/18 w/ no LV thrombus, Mild segmental left ventricular systolic dysfunction, mid inferolateral wall, and the basal inferolateral wall are hypokinetic.\par No telemetry events, patient feels well.\par \par TTE done today and briefly reviewed- improved LV function in normal range on GDMT. \par Patient feels well. No chest pain, sob. Has started going on walks recently for 30 minutes or so. Has not had any limitations. Continues to do so with his wife on exercise regimen. \par

## 2022-03-09 NOTE — DISCUSSION/SUMMARY
[FreeTextEntry1] : 60 year old man with h/o HTN (off losartan), CAD s/p STEMI s/p dCx intervention here in the office. \par \par CAD s/p STEMI Revascularization of dCx\par Doing well. EF preserved with structurally abnormal segments now that have completely resolved on goal directed medical therapy. No symptoms at all (NYHA I). \par -c/w aspirin 81 mg daily\par -c/w ticagrelor 90 mg BID\par -c/w atorvastatin 80 mg daily\par -c/w losartan to 50 mg daily for neurohormal control. \par -c/w metoprolol 50 mg daily \par -follow up in 5-6 months or soon if BP checks at home reveals persistently low BP. Currently doing well with EF normalization on current regimen so will maintain which was extensively explained to patient. \par -had lengthy conversation with patient regarding recent hospitalization, treatment plan going forward\par \par Follow up in office in 6 month. \par

## 2022-04-09 LAB
ANION GAP SERPL CALC-SCNC: 12 MMOL/L
APO LP(A) SERPL-MCNC: 19.2 NMOL/L
BUN SERPL-MCNC: 14 MG/DL
CALCIUM SERPL-MCNC: 10.1 MG/DL
CHLORIDE SERPL-SCNC: 104 MMOL/L
CHOLEST SERPL-MCNC: 121 MG/DL
CO2 SERPL-SCNC: 24 MMOL/L
CREAT SERPL-MCNC: 1.03 MG/DL
CRP SERPL-MCNC: <3 MG/L
EGFR: 82 ML/MIN/1.73M2
ESTIMATED AVERAGE GLUCOSE: 100 MG/DL
GLUCOSE SERPL-MCNC: 103 MG/DL
HBA1C MFR BLD HPLC: 5.1 %
HDLC SERPL-MCNC: 43 MG/DL
LDLC SERPL CALC-MCNC: 54 MG/DL
NONHDLC SERPL-MCNC: 77 MG/DL
NT-PROBNP SERPL-MCNC: 64 PG/ML
POTASSIUM SERPL-SCNC: 4.6 MMOL/L
SODIUM SERPL-SCNC: 140 MMOL/L
TRIGL SERPL-MCNC: 117 MG/DL

## 2022-04-22 LAB
25(OH)D3 SERPL-MCNC: 25.9 NG/ML
ALBUMIN SERPL ELPH-MCNC: 4.4 G/DL
ALP BLD-CCNC: 80 U/L
ALT SERPL-CCNC: 30 U/L
ANION GAP SERPL CALC-SCNC: 10 MMOL/L
APPEARANCE: CLEAR
AST SERPL-CCNC: 22 U/L
BASOPHILS # BLD AUTO: 0.03 K/UL
BASOPHILS NFR BLD AUTO: 0.4 %
BILIRUB SERPL-MCNC: 0.7 MG/DL
BILIRUBIN URINE: NEGATIVE
BLOOD URINE: NEGATIVE
BUN SERPL-MCNC: 17 MG/DL
CALCIUM SERPL-MCNC: 9.9 MG/DL
CHLORIDE SERPL-SCNC: 102 MMOL/L
CHOLEST SERPL-MCNC: 103 MG/DL
CO2 SERPL-SCNC: 27 MMOL/L
COLOR: NORMAL
CREAT SERPL-MCNC: 1.03 MG/DL
EGFR: 82 ML/MIN/1.73M2
EOSINOPHIL # BLD AUTO: 0.24 K/UL
EOSINOPHIL NFR BLD AUTO: 3.2 %
ESTIMATED AVERAGE GLUCOSE: 103 MG/DL
FOLATE SERPL-MCNC: 8.5 NG/ML
GLUCOSE QUALITATIVE U: NEGATIVE
GLUCOSE SERPL-MCNC: 100 MG/DL
HBA1C MFR BLD HPLC: 5.2 %
HCT VFR BLD CALC: 39.7 %
HDLC SERPL-MCNC: 44 MG/DL
HGB BLD-MCNC: 13.5 G/DL
IMM GRANULOCYTES NFR BLD AUTO: 0.5 %
KETONES URINE: NEGATIVE
LDLC SERPL CALC-MCNC: 41 MG/DL
LEUKOCYTE ESTERASE URINE: NEGATIVE
LYMPHOCYTES # BLD AUTO: 1.96 K/UL
LYMPHOCYTES NFR BLD AUTO: 26.1 %
MAN DIFF?: NORMAL
MCHC RBC-ENTMCNC: 30.5 PG
MCHC RBC-ENTMCNC: 34 GM/DL
MCV RBC AUTO: 89.8 FL
MONOCYTES # BLD AUTO: 0.55 K/UL
MONOCYTES NFR BLD AUTO: 7.3 %
NEUTROPHILS # BLD AUTO: 4.68 K/UL
NEUTROPHILS NFR BLD AUTO: 62.5 %
NITRITE URINE: NEGATIVE
NONHDLC SERPL-MCNC: 59 MG/DL
PH URINE: 6
PLATELET # BLD AUTO: 146 K/UL
POTASSIUM SERPL-SCNC: 4.6 MMOL/L
PROT SERPL-MCNC: 6.1 G/DL
PROTEIN URINE: NEGATIVE
PSA FREE FLD-MCNC: 18 %
PSA FREE SERPL-MCNC: 0.47 NG/ML
PSA SERPL-MCNC: 2.61 NG/ML
RBC # BLD: 4.42 M/UL
RBC # FLD: 12.5 %
SODIUM SERPL-SCNC: 139 MMOL/L
SPECIFIC GRAVITY URINE: 1.02
TRIGL SERPL-MCNC: 88 MG/DL
TSH SERPL-ACNC: 3.98 UIU/ML
UROBILINOGEN URINE: NORMAL
VIT B12 SERPL-MCNC: 576 PG/ML
WBC # FLD AUTO: 7.5 K/UL

## 2022-05-02 ENCOUNTER — APPOINTMENT (OUTPATIENT)
Dept: FAMILY MEDICINE | Facility: CLINIC | Age: 62
End: 2022-05-02
Payer: OTHER GOVERNMENT

## 2022-05-02 VITALS
SYSTOLIC BLOOD PRESSURE: 116 MMHG | HEIGHT: 70 IN | DIASTOLIC BLOOD PRESSURE: 70 MMHG | BODY MASS INDEX: 27.2 KG/M2 | RESPIRATION RATE: 16 BRPM | OXYGEN SATURATION: 97 % | TEMPERATURE: 98.7 F | WEIGHT: 190 LBS | HEART RATE: 79 BPM

## 2022-05-02 DIAGNOSIS — Z00.00 ENCOUNTER FOR GENERAL ADULT MEDICAL EXAMINATION W/OUT ABNORMAL FINDINGS: ICD-10-CM

## 2022-05-02 DIAGNOSIS — E78.5 HYPERLIPIDEMIA, UNSPECIFIED: ICD-10-CM

## 2022-05-02 DIAGNOSIS — Z92.29 PERSONAL HISTORY OF OTHER DRUG THERAPY: ICD-10-CM

## 2022-05-02 DIAGNOSIS — I21.3 ST ELEVATION (STEMI) MYOCARDIAL INFARCTION OF UNSPECIFIED SITE: ICD-10-CM

## 2022-05-02 DIAGNOSIS — I10 ESSENTIAL (PRIMARY) HYPERTENSION: ICD-10-CM

## 2022-05-02 PROCEDURE — 99396 PREV VISIT EST AGE 40-64: CPT

## 2022-05-02 NOTE — HISTORY OF PRESENT ILLNESS
[de-identified] : 62 y/o M PMhx CAD s/p 3 weeks STEMI s/p PCI (Losartan, ASA 81, Atorvastatin, Brilinta ,Metoprolol). Saw Cardio recently according to patient ECHO good. UTD with Covid vaccines (4)

## 2022-05-02 NOTE — PHYSICAL EXAM
[No Acute Distress] : no acute distress [Well Nourished] : well nourished [Well Developed] : well developed [Well-Appearing] : well-appearing [Normal Sclera/Conjunctiva] : normal sclera/conjunctiva [PERRL] : pupils equal round and reactive to light [EOMI] : extraocular movements intact [Normal Outer Ear/Nose] : the outer ears and nose were normal in appearance [Normal Oropharynx] : the oropharynx was normal [No JVD] : no jugular venous distention [No Lymphadenopathy] : no lymphadenopathy [Supple] : supple [Thyroid Normal, No Nodules] : the thyroid was normal and there were no nodules present [No Respiratory Distress] : no respiratory distress  [No Accessory Muscle Use] : no accessory muscle use [Clear to Auscultation] : lungs were clear to auscultation bilaterally [Normal Rate] : normal rate  [Regular Rhythm] : with a regular rhythm [Normal S1, S2] : normal S1 and S2 [No Murmur] : no murmur heard [No Carotid Bruits] : no carotid bruits [No Abdominal Bruit] : a ~M bruit was not heard ~T in the abdomen [No Varicosities] : no varicosities [Pedal Pulses Present] : the pedal pulses are present [No Edema] : there was no peripheral edema [No Palpable Aorta] : no palpable aorta [No Extremity Clubbing/Cyanosis] : no extremity clubbing/cyanosis [Soft] : abdomen soft [Non Tender] : non-tender [Non-distended] : non-distended [No Masses] : no abdominal mass palpated [No HSM] : no HSM [Normal Bowel Sounds] : normal bowel sounds [Normal Posterior Cervical Nodes] : no posterior cervical lymphadenopathy [Normal Anterior Cervical Nodes] : no anterior cervical lymphadenopathy [No CVA Tenderness] : no CVA  tenderness [No Spinal Tenderness] : no spinal tenderness [No Joint Swelling] : no joint swelling [Grossly Normal Strength/Tone] : grossly normal strength/tone [No Rash] : no rash [Coordination Grossly Intact] : coordination grossly intact [No Focal Deficits] : no focal deficits [Normal Gait] : normal gait [Deep Tendon Reflexes (DTR)] : deep tendon reflexes were 2+ and symmetric [Normal Affect] : the affect was normal [Normal Insight/Judgement] : insight and judgment were intact [FreeTextEntry1] : prostate smooth nonenelarged Guaiac NEG

## 2022-06-21 NOTE — ED ADULT TRIAGE NOTE - HEIGHT IN INCHES
8 Topical Ketoconazole Counseling: Patient counseled that this medication may cause skin irritation or allergic reactions.  In the event of skin irritation, the patient was advised to reduce the amount of the drug applied or use it less frequently.   The patient verbalized understanding of the proper use and possible adverse effects of ketoconazole.  All of the patient's questions and concerns were addressed.

## 2022-08-12 ENCOUNTER — APPOINTMENT (OUTPATIENT)
Dept: CARDIOLOGY | Facility: CLINIC | Age: 62
End: 2022-08-12

## 2022-08-12 ENCOUNTER — NON-APPOINTMENT (OUTPATIENT)
Age: 62
End: 2022-08-12

## 2022-08-12 VITALS
OXYGEN SATURATION: 99 % | SYSTOLIC BLOOD PRESSURE: 120 MMHG | HEIGHT: 70 IN | WEIGHT: 189 LBS | DIASTOLIC BLOOD PRESSURE: 76 MMHG | HEART RATE: 55 BPM | RESPIRATION RATE: 17 BRPM | BODY MASS INDEX: 27.06 KG/M2

## 2022-08-12 PROCEDURE — 99215 OFFICE O/P EST HI 40 MIN: CPT

## 2022-08-12 PROCEDURE — 93000 ELECTROCARDIOGRAM COMPLETE: CPT

## 2022-08-12 RX ORDER — ASPIRIN ENTERIC COATED TABLETS 81 MG 81 MG/1
81 TABLET, DELAYED RELEASE ORAL
Qty: 90 | Refills: 2 | Status: ACTIVE | COMMUNITY
Start: 2021-11-03 | End: 1900-01-01

## 2022-08-15 NOTE — HISTORY OF PRESENT ILLNESS
[FreeTextEntry1] : Filemon Munroe is a 60 year old man with h/o HTN (off losartan), CAD s/p STEMI s/p dCx intervention who presents today for follow up. Patient presented 10/17/2021 with STEMI with PCI to dCx for 100% occlusion. Other [facility] cath findings include 20-30% LAD lesion and 10% mRCA. CCU course c/b wide complex tachycardia which resolved. Echo w/ no LV thrombus, Mild segmental left ventricular systolic dysfunction, mid inferolateral wall, and the basal inferolateral wall are hypokinetic.Echo 10/18 w/ no LV thrombus, Mild segmental left ventricular systolic dysfunction, mid inferolateral wall, and the basal inferolateral wall are hypokinetic.\par No telemetry events, patient feels well.\par \par TTE done last visit and briefly reviewed- improved LV function in normal range on GDMT. \par Patient feels well. No chest pain, sob. Has started going on walks recently for 30 minutes or so. Has not had any limitations. Continues to do so with his wife on exercise regimen. \par

## 2022-08-15 NOTE — DISCUSSION/SUMMARY
[FreeTextEntry1] : 60 year old man with h/o HTN (off losartan), CAD s/p STEMI s/p dCx intervention here in the office. \par \par CAD s/p STEMI Revascularization of dCx\par Doing well. EF preserved with structurally abnormal segments now that have completely resolved on goal directed medical therapy. No symptoms at all (NYHA I). \par -c/w aspirin 81 mg daily\par -c/w ticagrelor 90 mg BID- to be stopped in end of October which will be >12 months from index \par -c/w atorvastatin 80 mg daily\par -c/w losartan to 50 mg daily for neurohormal control. \par -c/w metoprolol 50 mg daily \par -follow up in 5-6 months or soon if BP checks at home reveals persistently low BP. Currently doing well with EF normalization on current regimen so will maintain which was extensively explained to patient. \par -had lengthy conversation with patient regarding recent hospitalization, treatment plan going forward\par \par Follow up in office in 6 month. \par

## 2022-08-15 NOTE — CARDIOLOGY SUMMARY
[de-identified] : \par 10/18/2021 TTE:\par Conclusions:\par 1. Mitral annular calcification, otherwise normal mitral\par valve. Mild mitral regurgitation.\par 2. Calcified trileaflet aortic valve with normal opening.\par Mild aortic regurgitation.\par 3. Endocardial visualization enhanced with intravenous\par injection of Ultrasonic Enhancing Agent (Definity). Mild\par segmental left ventricular systolic dysfunction. No left\par ventricular thrombus. The mid inferolateral wall, and the\par basal  inferolateral wall are hypokinetic.\par 4. Normal right ventricular size and function.\par *** No previous Echo exam. [de-identified] : [facility] 10/2021:\par Conclusions: \par Culprit 100% mid Cx for cause of posterior inferior STEMI. LVEDP\par elevated.Successul PCI to Cx into OM2 with MANNY 3 flow\par resolution of chest pain and ST elevations.Maintain DAPT. Recommend\par aggresssive medical management of CAD as per\par ACC/AHA guidelines. Patient to be monitored closely in the CICU.  \par 2.5 x 26 resolute kareem placed, 12 ESTELA \par

## 2022-12-01 ENCOUNTER — APPOINTMENT (OUTPATIENT)
Dept: CARDIOLOGY | Facility: CLINIC | Age: 62
End: 2022-12-01

## 2022-12-01 ENCOUNTER — NON-APPOINTMENT (OUTPATIENT)
Age: 62
End: 2022-12-01

## 2022-12-01 VITALS
WEIGHT: 190 LBS | HEIGHT: 70 IN | DIASTOLIC BLOOD PRESSURE: 68 MMHG | OXYGEN SATURATION: 98 % | HEART RATE: 60 BPM | SYSTOLIC BLOOD PRESSURE: 110 MMHG | BODY MASS INDEX: 27.2 KG/M2

## 2022-12-01 PROCEDURE — 99215 OFFICE O/P EST HI 40 MIN: CPT

## 2022-12-01 PROCEDURE — 93000 ELECTROCARDIOGRAM COMPLETE: CPT

## 2022-12-01 RX ORDER — TICAGRELOR 90 MG/1
90 TABLET ORAL
Qty: 60 | Refills: 3 | Status: DISCONTINUED | COMMUNITY
Start: 2021-11-03 | End: 2022-12-01

## 2022-12-01 NOTE — HISTORY OF PRESENT ILLNESS
[FreeTextEntry1] : Filemon Munroe is a 60 year old man with h/o HTN (off losartan), CAD s/p STEMI s/p dCx intervention who presents today for follow up. Patient presented 10/17/2021 with STEMI with PCI to dCx for 100% occlusion. Other [facility] cath findings include 20-30% LAD lesion and 10% mRCA. CCU course c/b wide complex tachycardia which resolved. Echo w/ no LV thrombus, Mild segmental left ventricular systolic dysfunction, mid inferolateral wall, and the basal inferolateral wall are hypokinetic.Echo 10/18 w/ no LV thrombus, Mild segmental left ventricular systolic dysfunction, mid inferolateral wall, and the basal inferolateral wall are hypokinetic.\par No telemetry events, patient feels well.\par \par TTE done last visit and briefly reviewed- improved LV function in normal range on GDMT. \par Patient feels well. No chest pain, sob. Has started going on walks recently for 30 minutes or so. Has not had any limitations. Continues to do so with his wife on exercise regimen. HR 55-60\par

## 2022-12-01 NOTE — CARDIOLOGY SUMMARY
[de-identified] : \par 10/18/2021 TTE:\par Conclusions:\par 1. Mitral annular calcification, otherwise normal mitral\par valve. Mild mitral regurgitation.\par 2. Calcified trileaflet aortic valve with normal opening.\par Mild aortic regurgitation.\par 3. Endocardial visualization enhanced with intravenous\par injection of Ultrasonic Enhancing Agent (Definity). Mild\par segmental left ventricular systolic dysfunction. No left\par ventricular thrombus. The mid inferolateral wall, and the\par basal  inferolateral wall are hypokinetic.\par 4. Normal right ventricular size and function.\par *** No previous Echo exam. [de-identified] : [facility] 10/2021:\par Conclusions: \par Culprit 100% mid Cx for cause of posterior inferior STEMI. LVEDP\par elevated.Successul PCI to Cx into OM2 with MANNY 3 flow\par resolution of chest pain and ST elevations.Maintain DAPT. Recommend\par aggresssive medical management of CAD as per\par ACC/AHA guidelines. Patient to be monitored closely in the CICU.  \par 2.5 x 26 resolute kareem placed, 12 ESTELA \par

## 2022-12-01 NOTE — DISCUSSION/SUMMARY
[FreeTextEntry1] : 60 year old man with h/o HTN (off losartan), CAD s/p STEMI s/p dCx intervention here in the office. \par \par CAD s/p STEMI Revascularization of dCx\par Doing well. EF preserved with structurally abnormal segments now that have completely resolved on goal directed medical therapy. No symptoms at all (NYHA I). \par -c/w aspirin 81 mg daily\par -ticagrelor stopped given >12 months on therapy \par -c/w atorvastatin 80 mg daily\par -c/w losartan to 50 mg daily for neurohormal control. \par -c/w metoprolol, down to 25 mg daily \par -follow up in 5-6 months or soon if BP checks at home reveals persistently low BP. Currently doing well with EF normalization on current regimen so will maintain which was extensively explained to patient. \par -had lengthy conversation with patient regarding recent hospitalization, treatment plan going forward\par \par Follow up in office in 6 month. \par

## 2023-04-04 ENCOUNTER — RX RENEWAL (OUTPATIENT)
Age: 63
End: 2023-04-04

## 2023-04-04 RX ORDER — METOPROLOL SUCCINATE 50 MG/1
50 TABLET, EXTENDED RELEASE ORAL
Qty: 90 | Refills: 3 | Status: ACTIVE | COMMUNITY
Start: 2023-04-04 | End: 1900-01-01

## 2023-06-02 ENCOUNTER — APPOINTMENT (OUTPATIENT)
Dept: CARDIOLOGY | Facility: CLINIC | Age: 63
End: 2023-06-02
Payer: OTHER GOVERNMENT

## 2023-06-02 ENCOUNTER — NON-APPOINTMENT (OUTPATIENT)
Age: 63
End: 2023-06-02

## 2023-06-02 VITALS
SYSTOLIC BLOOD PRESSURE: 126 MMHG | RESPIRATION RATE: 17 BRPM | OXYGEN SATURATION: 95 % | HEART RATE: 74 BPM | WEIGHT: 200 LBS | DIASTOLIC BLOOD PRESSURE: 78 MMHG | HEIGHT: 70 IN | BODY MASS INDEX: 28.63 KG/M2

## 2023-06-02 PROCEDURE — 93000 ELECTROCARDIOGRAM COMPLETE: CPT

## 2023-06-02 PROCEDURE — 99215 OFFICE O/P EST HI 40 MIN: CPT

## 2023-06-02 NOTE — DISCUSSION/SUMMARY
[FreeTextEntry1] : 60 year old man with h/o HTN (off losartan), CAD s/p STEMI s/p dCx intervention here in the office. \par \par CAD s/p STEMI Revascularization of dCx\par Doing well. EF preserved with structurally abnormal segments now that have completely resolved on goal directed medical therapy. No symptoms at all (NYHA I). \par -c/w aspirin 81 mg daily\par -ticagrelor stopped given >12 months on therapy \par -c/w atorvastatin 80 mg daily\par -c/w losartan to 50 mg daily for neurohormal control. \par -c/w metoprolol, down to 25 mg daily \par -lab work today, telehealth \par -had lengthy conversation with patient regarding recent hospitalization, treatment plan going forward\par \par Follow up in office in 6 month. \par   [EKG obtained to assist in diagnosis and management of assessed problem(s)] : EKG obtained to assist in diagnosis and management of assessed problem(s)

## 2023-06-02 NOTE — HISTORY OF PRESENT ILLNESS
[FreeTextEntry1] : Filemon Munroe is a 60 year old man with h/o HTN (off losartan), CAD s/p STEMI s/p dCx intervention who presents today for follow up. Patient presented 10/17/2021 with STEMI with PCI to dCx for 100% occlusion. Other [facility] cath findings include 20-30% LAD lesion and 10% mRCA. CCU course c/b wide complex tachycardia which resolved. Echo w/ no LV thrombus, Mild segmental left ventricular systolic dysfunction, mid inferolateral wall, and the basal inferolateral wall are hypokinetic.Echo 10/18 w/ no LV thrombus, Mild segmental left ventricular systolic dysfunction, mid inferolateral wall, and the basal inferolateral wall are hypokinetic.\par No telemetry events, patient feels well.\par \par TTE done last visit and briefly reviewed- improved LV function in normal range on GDMT. \par Patient feels well. No chest pain, sob. Has started going on walks recently for 30 minutes or so. Has not had any limitations. Continues to do so with his wife on exercise regimen. HR 55-60.\par \par

## 2023-06-02 NOTE — CARDIOLOGY SUMMARY
[de-identified] : \par 10/18/2021 TTE:\par Conclusions:\par 1. Mitral annular calcification, otherwise normal mitral\par valve. Mild mitral regurgitation.\par 2. Calcified trileaflet aortic valve with normal opening.\par Mild aortic regurgitation.\par 3. Endocardial visualization enhanced with intravenous\par injection of Ultrasonic Enhancing Agent (Definity). Mild\par segmental left ventricular systolic dysfunction. No left\par ventricular thrombus. The mid inferolateral wall, and the\par basal  inferolateral wall are hypokinetic.\par 4. Normal right ventricular size and function.\par *** No previous Echo exam. [de-identified] : [facility] 10/2021:\par Conclusions: \par Culprit 100% mid Cx for cause of posterior inferior STEMI. LVEDP\par elevated.Successul PCI to Cx into OM2 with MANNY 3 flow\par resolution of chest pain and ST elevations.Maintain DAPT. Recommend\par aggresssive medical management of CAD as per\par ACC/AHA guidelines. Patient to be monitored closely in the CICU.  \par 2.5 x 26 resolute kareem placed, 12 ESTELA \par

## 2023-06-23 ENCOUNTER — APPOINTMENT (OUTPATIENT)
Dept: CARDIOLOGY | Facility: CLINIC | Age: 63
End: 2023-06-23
Payer: OTHER GOVERNMENT

## 2023-06-23 PROCEDURE — 99213 OFFICE O/P EST LOW 20 MIN: CPT | Mod: 95

## 2023-06-23 NOTE — CARDIOLOGY SUMMARY
[de-identified] : \par 10/18/2021 TTE:\par Conclusions:\par 1. Mitral annular calcification, otherwise normal mitral\par valve. Mild mitral regurgitation.\par 2. Calcified trileaflet aortic valve with normal opening.\par Mild aortic regurgitation.\par 3. Endocardial visualization enhanced with intravenous\par injection of Ultrasonic Enhancing Agent (Definity). Mild\par segmental left ventricular systolic dysfunction. No left\par ventricular thrombus. The mid inferolateral wall, and the\par basal  inferolateral wall are hypokinetic.\par 4. Normal right ventricular size and function.\par *** No previous Echo exam. [de-identified] : [facility] 10/2021:\par Conclusions: \par Culprit 100% mid Cx for cause of posterior inferior STEMI. LVEDP\par elevated.Successul PCI to Cx into OM2 with MANNY 3 flow\par resolution of chest pain and ST elevations.Maintain DAPT. Recommend\par aggresssive medical management of CAD as per\par ACC/AHA guidelines. Patient to be monitored closely in the CICU.  \par 2.5 x 26 resolute kareem placed, 12 ESTELA \par

## 2023-06-23 NOTE — DISCUSSION/SUMMARY
[FreeTextEntry1] : 60 year old man with h/o HTN (off losartan), CAD s/p STEMI s/p dCx intervention here in the office. \par \par CAD s/p STEMI Revascularization of dCx\par Doing well. EF preserved with structurally abnormal segments now that have completely resolved on goal directed medical therapy. No symptoms at all (NYHA I). Labs reviewed, LDL within normal as A1c is. \par -c/w aspirin 81 mg daily\par -ticagrelor stopped given >12 months on therapy \par -c/w atorvastatin 80 mg daily\par -c/w losartan to 50 mg daily for neurohormal control. \par -c/w metoprolol, down to 25 mg daily \par -lab work reviewed, telehealth \par -had lengthy conversation with patient regarding recent hospitalization, treatment plan going forward\par \par Follow up in office in 6 month. \par

## 2023-06-23 NOTE — HISTORY OF PRESENT ILLNESS
[Home] : at home, [unfilled] , at the time of the visit. [Medical Office: ([facility])___] : at the medical office located in  [Verbal consent obtained from patient] : the patient, [unfilled] [FreeTextEntry1] : Filemon Munroe is a 60 year old man with h/o HTN (off losartan), CAD s/p STEMI s/p dCx intervention who presents today for follow up. Patient presented 10/17/2021 with STEMI with PCI to dCx for 100% occlusion. Other [facility] cath findings include 20-30% LAD lesion and 10% mRCA. CCU course c/b wide complex tachycardia which resolved. Echo w/ no LV thrombus, Mild segmental left ventricular systolic dysfunction, mid inferolateral wall, and the basal inferolateral wall are hypokinetic.Echo 10/18 w/ no LV thrombus, Mild segmental left ventricular systolic dysfunction, mid inferolateral wall, and the basal inferolateral wall are hypokinetic.\par No telemetry events, patient feels well.\par \par TTE done last visit and briefly reviewed- improved LV function in normal range on GDMT. \par Patient feels well. No chest pain, sob. Has started going on walks recently for 30 minutes or so. Has not had any limitations. Continues to do so with his wife on exercise regimen. HR 55-60.\par \par

## 2023-06-26 LAB
ANION GAP SERPL CALC-SCNC: 12 MMOL/L
BUN SERPL-MCNC: 15 MG/DL
CALCIUM SERPL-MCNC: 9.5 MG/DL
CHLORIDE SERPL-SCNC: 103 MMOL/L
CHOLEST SERPL-MCNC: 126 MG/DL
CO2 SERPL-SCNC: 25 MMOL/L
CREAT SERPL-MCNC: 1.08 MG/DL
EGFR: 77 ML/MIN/1.73M2
ESTIMATED AVERAGE GLUCOSE: 111 MG/DL
GLUCOSE SERPL-MCNC: 109 MG/DL
HBA1C MFR BLD HPLC: 5.5 %
HDLC SERPL-MCNC: 42 MG/DL
LDLC SERPL CALC-MCNC: 55 MG/DL
NONHDLC SERPL-MCNC: 85 MG/DL
POTASSIUM SERPL-SCNC: 4.6 MMOL/L
SODIUM SERPL-SCNC: 141 MMOL/L
TRIGL SERPL-MCNC: 149 MG/DL

## 2023-08-11 ENCOUNTER — RX RENEWAL (OUTPATIENT)
Age: 63
End: 2023-08-11

## 2023-09-13 ENCOUNTER — RX RENEWAL (OUTPATIENT)
Age: 63
End: 2023-09-13

## 2023-09-13 RX ORDER — ATORVASTATIN CALCIUM 80 MG/1
80 TABLET, FILM COATED ORAL
Qty: 90 | Refills: 3 | Status: ACTIVE | COMMUNITY
Start: 2021-11-03 | End: 1900-01-01

## 2023-11-14 ENCOUNTER — RX RENEWAL (OUTPATIENT)
Age: 63
End: 2023-11-14

## 2023-11-14 RX ORDER — LOSARTAN POTASSIUM 50 MG/1
50 TABLET, FILM COATED ORAL DAILY
Qty: 90 | Refills: 3 | Status: ACTIVE | COMMUNITY
Start: 2018-04-24 | End: 1900-01-01

## 2024-01-22 ENCOUNTER — RX RENEWAL (OUTPATIENT)
Age: 64
End: 2024-01-22

## 2024-01-22 RX ORDER — METOPROLOL SUCCINATE 25 MG/1
25 TABLET, EXTENDED RELEASE ORAL DAILY
Qty: 30 | Refills: 3 | Status: ACTIVE | COMMUNITY
Start: 2021-11-03 | End: 1900-01-01

## 2024-07-18 NOTE — H&P ADULT - NSHPREVIEWOFSYSTEMS_GEN_ALL_CORE
REVIEW OF SYSTEMS:  Constitutional: [ ] fevers [ ] chills [ ] weight loss [ ] weight gain  HEENT: [ ] dry eyes [ ] eye irritation   CV: [X ] chest discomfort [ ] orthopnea [ ] palpitations   Resp: [ ] cough [ ] shortness of breath [ ] dyspnea [ ] wheezing [ ] sputum [ ] hemoptysis  GI: [ ] nausea [ ] vomiting [ ] diarrhea [ ] constipation [ ] abd pain [ ] dysphagia   : [ ] dysuria [ ] hematuria [ ] increased urinary frequency  Musculoskeletal: [ ] back pain [ ] myalgias [ ] arthralgias  Skin: [ ] rash [ ] itch  Neurological: [ ] headache [ ] dizziness [ ] syncope [ ] weakness [ ] numbness  Psychiatric: [ ] anxiety [ ] depression  Hematologic/Lymphatic: [ ] anemia [ ] bleeding problem  Allergic/Immunologic: [ ] itchy eyes [ ] nasal discharge [ ] hives [ ] angioedema  [X ] All other systems negative  [ ] Unable to assess ROS because ________
No
